# Patient Record
Sex: MALE | Race: WHITE | NOT HISPANIC OR LATINO | Employment: FULL TIME | URBAN - METROPOLITAN AREA
[De-identification: names, ages, dates, MRNs, and addresses within clinical notes are randomized per-mention and may not be internally consistent; named-entity substitution may affect disease eponyms.]

---

## 2023-04-05 DIAGNOSIS — R05.3 CHRONIC COUGH: ICD-10-CM

## 2023-04-05 DIAGNOSIS — I25.10 CORONARY ARTERY DISEASE INVOLVING NATIVE HEART, UNSPECIFIED VESSEL OR LESION TYPE, UNSPECIFIED WHETHER ANGINA PRESENT: ICD-10-CM

## 2023-04-05 DIAGNOSIS — E78.5 DYSLIPIDEMIA: ICD-10-CM

## 2023-04-05 DIAGNOSIS — I10 HYPERTENSION, UNSPECIFIED TYPE: ICD-10-CM

## 2023-04-05 DIAGNOSIS — J30.9 ALLERGIC RHINITIS, UNSPECIFIED SEASONALITY, UNSPECIFIED TRIGGER: ICD-10-CM

## 2023-04-05 PROBLEM — R91.8 LUNG NODULES: Status: ACTIVE | Noted: 2023-04-05

## 2023-04-05 PROBLEM — M70.20 OLECRANON BURSITIS: Status: ACTIVE | Noted: 2023-04-05

## 2023-04-05 PROBLEM — J44.9 COPD (CHRONIC OBSTRUCTIVE PULMONARY DISEASE) (MULTI): Status: ACTIVE | Noted: 2023-04-05

## 2023-04-05 PROBLEM — I65.29 OCCLUSION AND STENOSIS OF UNSPECIFIED CAROTID ARTERY: Status: ACTIVE | Noted: 2023-04-05

## 2023-04-05 PROBLEM — E55.9 VITAMIN D DEFICIENCY: Status: ACTIVE | Noted: 2023-04-05

## 2023-04-05 PROBLEM — G47.33 OSA (OBSTRUCTIVE SLEEP APNEA): Status: ACTIVE | Noted: 2023-04-05

## 2023-04-05 PROBLEM — N52.9 MALE ERECTILE DISORDER OF ORGANIC ORIGIN: Status: ACTIVE | Noted: 2023-04-05

## 2023-04-05 RX ORDER — MONTELUKAST SODIUM 10 MG/1
1 TABLET ORAL NIGHTLY
COMMUNITY
Start: 2013-08-14 | End: 2023-04-05 | Stop reason: SDUPTHER

## 2023-04-05 RX ORDER — LOSARTAN POTASSIUM 100 MG/1
100 TABLET ORAL DAILY
Qty: 90 TABLET | Refills: 3 | Status: SHIPPED | OUTPATIENT
Start: 2023-04-05 | End: 2024-04-23

## 2023-04-05 RX ORDER — AMLODIPINE BESYLATE 5 MG/1
1 TABLET ORAL DAILY
COMMUNITY
Start: 2018-04-30 | End: 2023-04-05 | Stop reason: SDUPTHER

## 2023-04-05 RX ORDER — MONTELUKAST SODIUM 10 MG/1
10 TABLET ORAL NIGHTLY
Qty: 90 TABLET | Refills: 3 | Status: SHIPPED | OUTPATIENT
Start: 2023-04-05 | End: 2024-06-03

## 2023-04-05 RX ORDER — AZELASTINE 1 MG/ML
2 SPRAY, METERED NASAL 2 TIMES DAILY
COMMUNITY
Start: 2017-09-19 | End: 2023-04-05 | Stop reason: SDUPTHER

## 2023-04-05 RX ORDER — AMLODIPINE BESYLATE 5 MG/1
5 TABLET ORAL DAILY
Qty: 90 TABLET | Refills: 3 | Status: SHIPPED | OUTPATIENT
Start: 2023-04-05 | End: 2024-04-29 | Stop reason: SDUPTHER

## 2023-04-05 RX ORDER — UMECLIDINIUM BROMIDE AND VILANTEROL TRIFENATATE 62.5; 25 UG/1; UG/1
1 POWDER RESPIRATORY (INHALATION) DAILY
COMMUNITY
Start: 2020-11-04

## 2023-04-05 RX ORDER — ASPIRIN 81 MG/1
1 TABLET ORAL DAILY
COMMUNITY
Start: 2020-05-01

## 2023-04-05 RX ORDER — SILDENAFIL 100 MG/1
TABLET, FILM COATED ORAL
COMMUNITY
Start: 2014-06-03 | End: 2023-09-08 | Stop reason: SDUPTHER

## 2023-04-05 RX ORDER — TERBINAFINE HYDROCHLORIDE 250 MG/1
1 TABLET ORAL DAILY
COMMUNITY
Start: 2022-07-22 | End: 2023-08-18 | Stop reason: ALTCHOICE

## 2023-04-05 RX ORDER — ATORVASTATIN CALCIUM 20 MG/1
1 TABLET, FILM COATED ORAL NIGHTLY
COMMUNITY
Start: 2020-05-04 | End: 2023-04-05 | Stop reason: SDUPTHER

## 2023-04-05 RX ORDER — ATORVASTATIN CALCIUM 20 MG/1
20 TABLET, FILM COATED ORAL NIGHTLY
Qty: 90 TABLET | Refills: 3 | Status: SHIPPED | OUTPATIENT
Start: 2023-04-05 | End: 2024-04-08

## 2023-04-05 RX ORDER — HYDROCHLOROTHIAZIDE 12.5 MG/1
12.5 TABLET ORAL DAILY
Qty: 90 TABLET | Refills: 3 | Status: SHIPPED | OUTPATIENT
Start: 2023-04-05 | End: 2024-06-03

## 2023-04-05 RX ORDER — HYDROCHLOROTHIAZIDE 12.5 MG/1
1 TABLET ORAL DAILY
COMMUNITY
Start: 2018-04-29 | End: 2023-04-05 | Stop reason: SDUPTHER

## 2023-04-05 RX ORDER — ALBUTEROL SULFATE 90 UG/1
2 AEROSOL, METERED RESPIRATORY (INHALATION) EVERY 6 HOURS PRN
COMMUNITY
Start: 2013-08-14 | End: 2023-10-12 | Stop reason: SDUPTHER

## 2023-04-05 RX ORDER — AZELASTINE 1 MG/ML
2 SPRAY, METERED NASAL 2 TIMES DAILY
Qty: 72 ML | Refills: 3 | Status: SHIPPED | OUTPATIENT
Start: 2023-04-05 | End: 2024-04-23

## 2023-08-18 ENCOUNTER — OFFICE VISIT (OUTPATIENT)
Dept: PRIMARY CARE | Facility: CLINIC | Age: 63
End: 2023-08-18
Payer: COMMERCIAL

## 2023-08-18 VITALS
OXYGEN SATURATION: 98 % | TEMPERATURE: 98.5 F | BODY MASS INDEX: 24.86 KG/M2 | HEART RATE: 61 BPM | DIASTOLIC BLOOD PRESSURE: 80 MMHG | WEIGHT: 177.6 LBS | SYSTOLIC BLOOD PRESSURE: 128 MMHG | HEIGHT: 71 IN | RESPIRATION RATE: 16 BRPM

## 2023-08-18 DIAGNOSIS — I10 PRIMARY HYPERTENSION: ICD-10-CM

## 2023-08-18 DIAGNOSIS — R91.8 LUNG NODULES: ICD-10-CM

## 2023-08-18 DIAGNOSIS — G47.31 CENTRAL SLEEP APNEA: ICD-10-CM

## 2023-08-18 DIAGNOSIS — J44.9 CHRONIC OBSTRUCTIVE PULMONARY DISEASE, UNSPECIFIED COPD TYPE (MULTI): ICD-10-CM

## 2023-08-18 DIAGNOSIS — Z12.5 SCREENING PSA (PROSTATE SPECIFIC ANTIGEN): ICD-10-CM

## 2023-08-18 DIAGNOSIS — Z00.00 WELL ADULT EXAM: Primary | ICD-10-CM

## 2023-08-18 DIAGNOSIS — I65.29 OCCLUSION AND STENOSIS OF UNSPECIFIED CAROTID ARTERY: ICD-10-CM

## 2023-08-18 PROBLEM — M25.562 LEFT KNEE PAIN: Status: ACTIVE | Noted: 2023-08-18

## 2023-08-18 PROBLEM — R94.39 ABNORMAL STRESS TEST: Status: ACTIVE | Noted: 2023-08-18

## 2023-08-18 PROBLEM — R05.3 CHRONIC COUGH: Status: RESOLVED | Noted: 2023-04-05 | Resolved: 2023-08-18

## 2023-08-18 PROBLEM — M25.562 LEFT KNEE PAIN: Status: RESOLVED | Noted: 2023-08-18 | Resolved: 2023-08-18

## 2023-08-18 PROBLEM — R07.9 CHEST PAIN: Status: RESOLVED | Noted: 2023-08-18 | Resolved: 2023-08-18

## 2023-08-18 PROBLEM — M70.20 OLECRANON BURSITIS: Status: RESOLVED | Noted: 2023-04-05 | Resolved: 2023-08-18

## 2023-08-18 PROBLEM — R07.9 CHEST PAIN: Status: ACTIVE | Noted: 2023-08-18

## 2023-08-18 LAB
ALANINE AMINOTRANSFERASE (SGPT) (U/L) IN SER/PLAS: 52 U/L (ref 10–52)
ALBUMIN (G/DL) IN SER/PLAS: 4.4 G/DL (ref 3.4–5)
ALKALINE PHOSPHATASE (U/L) IN SER/PLAS: 65 U/L (ref 33–136)
ANION GAP IN SER/PLAS: 10 MMOL/L (ref 10–20)
ASPARTATE AMINOTRANSFERASE (SGOT) (U/L) IN SER/PLAS: 45 U/L (ref 9–39)
BILIRUBIN TOTAL (MG/DL) IN SER/PLAS: 0.6 MG/DL (ref 0–1.2)
CALCIUM (MG/DL) IN SER/PLAS: 9.6 MG/DL (ref 8.6–10.6)
CARBON DIOXIDE, TOTAL (MMOL/L) IN SER/PLAS: 31 MMOL/L (ref 21–32)
CHLORIDE (MMOL/L) IN SER/PLAS: 103 MMOL/L (ref 98–107)
CHOLESTEROL (MG/DL) IN SER/PLAS: 148 MG/DL (ref 0–199)
CHOLESTEROL IN HDL (MG/DL) IN SER/PLAS: 67.2 MG/DL
CHOLESTEROL/HDL RATIO: 2.2
CREATININE (MG/DL) IN SER/PLAS: 1.22 MG/DL (ref 0.5–1.3)
ERYTHROCYTE DISTRIBUTION WIDTH (RATIO) BY AUTOMATED COUNT: 12.7 % (ref 11.5–14.5)
ERYTHROCYTE MEAN CORPUSCULAR HEMOGLOBIN CONCENTRATION (G/DL) BY AUTOMATED: 32.7 G/DL (ref 32–36)
ERYTHROCYTE MEAN CORPUSCULAR VOLUME (FL) BY AUTOMATED COUNT: 96 FL (ref 80–100)
ERYTHROCYTES (10*6/UL) IN BLOOD BY AUTOMATED COUNT: 4.27 X10E12/L (ref 4.5–5.9)
GFR MALE: 68 ML/MIN/1.73M2
GLUCOSE (MG/DL) IN SER/PLAS: 90 MG/DL (ref 74–99)
HEMATOCRIT (%) IN BLOOD BY AUTOMATED COUNT: 41 % (ref 41–52)
HEMOGLOBIN (G/DL) IN BLOOD: 13.4 G/DL (ref 13.5–17.5)
LDL: 72 MG/DL (ref 0–99)
LEUKOCYTES (10*3/UL) IN BLOOD BY AUTOMATED COUNT: 7.6 X10E9/L (ref 4.4–11.3)
NRBC (PER 100 WBCS) BY AUTOMATED COUNT: 0 /100 WBC (ref 0–0)
PLATELETS (10*3/UL) IN BLOOD AUTOMATED COUNT: 229 X10E9/L (ref 150–450)
POTASSIUM (MMOL/L) IN SER/PLAS: 4.3 MMOL/L (ref 3.5–5.3)
PROSTATE SPECIFIC AG (NG/ML) IN SER/PLAS: 0.69 NG/ML (ref 0–4)
PROTEIN TOTAL: 6.8 G/DL (ref 6.4–8.2)
SODIUM (MMOL/L) IN SER/PLAS: 140 MMOL/L (ref 136–145)
TRIGLYCERIDE (MG/DL) IN SER/PLAS: 43 MG/DL (ref 0–149)
UREA NITROGEN (MG/DL) IN SER/PLAS: 23 MG/DL (ref 6–23)
VLDL: 9 MG/DL (ref 0–40)

## 2023-08-18 PROCEDURE — 99396 PREV VISIT EST AGE 40-64: CPT | Performed by: FAMILY MEDICINE

## 2023-08-18 PROCEDURE — 3074F SYST BP LT 130 MM HG: CPT | Performed by: FAMILY MEDICINE

## 2023-08-18 PROCEDURE — 84153 ASSAY OF PSA TOTAL: CPT

## 2023-08-18 PROCEDURE — 85027 COMPLETE CBC AUTOMATED: CPT

## 2023-08-18 PROCEDURE — 80061 LIPID PANEL: CPT

## 2023-08-18 PROCEDURE — 3079F DIAST BP 80-89 MM HG: CPT | Performed by: FAMILY MEDICINE

## 2023-08-18 PROCEDURE — 80053 COMPREHEN METABOLIC PANEL: CPT

## 2023-08-18 PROCEDURE — 1036F TOBACCO NON-USER: CPT | Performed by: FAMILY MEDICINE

## 2023-08-18 RX ORDER — MELOXICAM 15 MG/1
7.5 TABLET ORAL DAILY
COMMUNITY
End: 2023-08-18 | Stop reason: ALTCHOICE

## 2023-08-18 RX ORDER — IBUPROFEN 800 MG/1
800 TABLET ORAL EVERY 6 HOURS
COMMUNITY
End: 2023-08-18 | Stop reason: ALTCHOICE

## 2023-08-18 RX ORDER — ROSUVASTATIN CALCIUM 20 MG/1
20 TABLET, COATED ORAL DAILY
COMMUNITY

## 2023-08-18 RX ORDER — FLUTICASONE PROPIONATE 50 MCG
1 SPRAY, SUSPENSION (ML) NASAL DAILY
COMMUNITY
Start: 2020-10-06

## 2023-08-18 RX ORDER — FLUTICASONE PROPIONATE 50 UG/1
1 POWDER, METERED RESPIRATORY (INHALATION)
COMMUNITY
End: 2023-08-18 | Stop reason: ALTCHOICE

## 2023-08-18 RX ORDER — FLUTICASONE PROPIONATE AND SALMETEROL 250; 50 UG/1; UG/1
1 POWDER RESPIRATORY (INHALATION)
COMMUNITY
End: 2023-08-18 | Stop reason: ALTCHOICE

## 2023-08-18 RX ORDER — CLOPIDOGREL BISULFATE 75 MG/1
75 TABLET ORAL DAILY
COMMUNITY

## 2023-08-18 ASSESSMENT — ENCOUNTER SYMPTOMS
BACK PAIN: 0
WEAKNESS: 1
ABDOMINAL PAIN: 0
BLOOD IN STOOL: 0
HEADACHES: 0
EYE REDNESS: 0
SORE THROAT: 0
CHILLS: 0
RHINORRHEA: 0
BRUISES/BLEEDS EASILY: 0
LOSS OF SENSATION IN FEET: 0
HALLUCINATIONS: 0
EYE PAIN: 0
FATIGUE: 0
ADENOPATHY: 0
DYSURIA: 0
WOUND: 0
COUGH: 0
NECK STIFFNESS: 0
OCCASIONAL FEELINGS OF UNSTEADINESS: 0
HEMATURIA: 0
SHORTNESS OF BREATH: 0
FEVER: 0
PALPITATIONS: 0
POLYDIPSIA: 0
DEPRESSION: 0

## 2023-08-18 ASSESSMENT — PATIENT HEALTH QUESTIONNAIRE - PHQ9
SUM OF ALL RESPONSES TO PHQ9 QUESTIONS 1 AND 2: 0
2. FEELING DOWN, DEPRESSED OR HOPELESS: NOT AT ALL
1. LITTLE INTEREST OR PLEASURE IN DOING THINGS: NOT AT ALL

## 2023-08-18 NOTE — PROGRESS NOTES
Sebastián Cronin is a 62 y.o. male with Chief Complaint of Annual Exam (CPE).    Runs for exercise daily.   Down 15# [id number] -- due to extensive dental work. Invisilign.    Past Surgical History:   Procedure Laterality Date    COLONOSCOPY  02/14/2017    Complete Colonoscopy    TONSILLECTOMY  08/14/2013    Tonsillectomy      Social History     Socioeconomic History    Marital status:      Spouse name: Not on file    Number of children: Not on file    Years of education: Not on file    Highest education level: Not on file   Occupational History    Not on file   Tobacco Use    Smoking status: Former     Types: Cigarettes     Passive exposure: Past    Smokeless tobacco: Never   Vaping Use    Vaping Use: Never used   Substance and Sexual Activity    Alcohol use: Not Currently    Drug use: Never    Sexual activity: Yes     Partners: Female   Other Topics Concern    Not on file   Social History Narrative    Not on file     Social Determinants of Health     Financial Resource Strain: Not on file   Food Insecurity: Not on file   Transportation Needs: Not on file   Physical Activity: Not on file   Stress: Not on file   Social Connections: Not on file   Intimate Partner Violence: Not on file   Housing Stability: Not on file     Past Medical History:   Diagnosis Date    Acute upper respiratory infection, unspecified 08/24/2015    URI, acute    Lateral epicondylitis, unspecified elbow 04/01/2015    Lateral epicondylitis    Olecranon bursitis 04/05/2023    Other specified disorders of Eustachian tube, unspecified ear 02/08/2016    Eustachian tube dysfunction    Pain in unspecified shoulder 07/09/2014    Shoulder pain    Personal history of other diseases of the respiratory system 03/05/2020    History of pleurisy    Personal history of other diseases of the respiratory system 12/26/2014    History of acute sinusitis    Personal history of other diseases of the respiratory system 06/15/2016    History of allergic rhinitis  "     No family history on file.     Review of Systems   Constitutional:  Negative for chills, fatigue and fever.   HENT:  Negative for rhinorrhea and sore throat.    Eyes:  Negative for pain and redness.   Respiratory:  Negative for cough and shortness of breath.    Cardiovascular:  Negative for chest pain and palpitations.   Gastrointestinal:  Negative for abdominal pain and blood in stool.   Endocrine: Negative for polydipsia and polyuria.   Genitourinary:  Negative for dysuria and hematuria.   Musculoskeletal:  Negative for back pain and neck stiffness.   Skin:  Negative for rash and wound.   Allergic/Immunologic: Negative for environmental allergies and food allergies.   Neurological:  Positive for weakness. Negative for headaches.   Hematological:  Negative for adenopathy. Does not bruise/bleed easily.   Psychiatric/Behavioral:  Negative for hallucinations and suicidal ideas.       /80 (BP Location: Left arm, Patient Position: Sitting, BP Cuff Size: Adult)   Pulse 61   Temp 36.9 °C (98.5 °F) (Temporal)   Resp 16   Ht 1.803 m (5' 11\")   Wt 80.6 kg (177 lb 9.6 oz)   SpO2 98%   BMI 24.77 kg/m²   Physical Exam  Vitals reviewed.   Constitutional:       General: He is not in acute distress.     Appearance: He is not ill-appearing.   HENT:      Head: Normocephalic and atraumatic.      Right Ear: Tympanic membrane normal.      Left Ear: Tympanic membrane normal.      Nose: No congestion or rhinorrhea.      Mouth/Throat:      Pharynx: No oropharyngeal exudate or posterior oropharyngeal erythema.   Eyes:      Extraocular Movements: Extraocular movements intact.      Conjunctiva/sclera: Conjunctivae normal.      Pupils: Pupils are equal, round, and reactive to light.   Cardiovascular:      Rate and Rhythm: Normal rate and regular rhythm.      Heart sounds: No murmur heard.     No friction rub. No gallop.   Pulmonary:      Effort: Pulmonary effort is normal.      Breath sounds: Normal breath sounds. No " "wheezing, rhonchi or rales.   Abdominal:      General: There is no distension.      Palpations: Abdomen is soft.      Tenderness: There is no abdominal tenderness. There is no guarding or rebound.   Musculoskeletal:         General: No swelling or deformity.      Cervical back: Normal range of motion and neck supple.      Right lower leg: No edema.      Left lower leg: No edema.   Skin:     Capillary Refill: Capillary refill takes less than 2 seconds.      Coloration: Skin is not jaundiced.      Findings: No rash.   Neurological:      General: No focal deficit present.      Mental Status: He is alert.      Motor: No weakness.   Psychiatric:         Mood and Affect: Mood normal.         Behavior: Behavior normal.       Lab Results   Component Value Date    WBC 8.0 07/22/2022    HGB 13.8 07/22/2022    HCT 41.0 07/22/2022    MCV 93 07/22/2022     07/22/2022     Lab Results   Component Value Date    CHOL 138 07/22/2022    CHOL 139 05/07/2021    CHOL 187 07/13/2019     Lab Results   Component Value Date    HDL 55.2 07/22/2022    HDL 61.0 05/07/2021    HDL 52.1 07/13/2019     No results found for: \"LDLCALC\"  Lab Results   Component Value Date    TRIG 59 07/22/2022    TRIG 48 05/07/2021    TRIG 69 07/13/2019     No components found for: \"CHOLHDL\"  No results found for: \"HGBA1C\"    Assessment/Plan   Problem List Items Addressed This Visit       COPD (chronic obstructive pulmonary disease) (CMS/[facility])    Overview     Stable on Anoro, albuterol. follow up Dr. Hogue. Quit smoking 1997.         Hypertension    Current Assessment & Plan     stable on amlodipine 5, losartan 100 and hctz 12.5.          Lung nodules    Overview     2019 CT: 2 stable lung nodules.  No further monitoring necessary.         Occlusion and stenosis of unspecified carotid artery    Overview     Left carotid stenosis 100% s/p dissection -- with some recanalization.  Sees vascular surgeon Dr Giang at [facility] for annual US.         Central sleep apnea    " Overview      BiPap per NANCY (Mykel)   No falling asleep at wheel.             Well adult exam - Primary    Overview     8/18/23 PT PREVENTATIVE EXAM:  BMI in normal range after 15# wt loss during year long dental work. Last colonoscopy 9/2022 next due 2027. Check labs.     Discussed taking some breakfast before exercise to prevent low sugars which might be causing leg weakness mid -run.

## 2023-08-18 NOTE — PATIENT INSTRUCTIONS
Discussed taking some breakfast before exercise to prevent low sugars which might be causing leg weakness mid-run.

## 2023-09-08 DIAGNOSIS — N52.9 MALE ERECTILE DISORDER OF ORGANIC ORIGIN: ICD-10-CM

## 2023-09-08 RX ORDER — SILDENAFIL 100 MG/1
100 TABLET, FILM COATED ORAL AS NEEDED
Qty: 10 TABLET | Refills: 6 | Status: SHIPPED | OUTPATIENT
Start: 2023-09-08 | End: 2024-02-21

## 2023-10-12 DIAGNOSIS — J44.1 CHRONIC OBSTRUCTIVE PULMONARY DISEASE WITH ACUTE EXACERBATION (MULTI): ICD-10-CM

## 2023-10-12 RX ORDER — ALBUTEROL SULFATE 90 UG/1
2 AEROSOL, METERED RESPIRATORY (INHALATION) EVERY 6 HOURS PRN
Qty: 3 G | Refills: 3 | Status: SHIPPED | OUTPATIENT
Start: 2023-10-12 | End: 2024-01-10

## 2023-11-21 ENCOUNTER — TELEPHONE (OUTPATIENT)
Dept: PRIMARY CARE | Facility: CLINIC | Age: 63
End: 2023-11-21
Payer: COMMERCIAL

## 2023-11-21 NOTE — TELEPHONE ENCOUNTER
Pt wife Carla olivera stated that the pt is having horrible leg crampsespecially at night wantsd to know if he should have bld work or office visit ?

## 2023-11-22 DIAGNOSIS — R25.2 LEG CRAMPING: ICD-10-CM

## 2023-11-24 ENCOUNTER — LAB (OUTPATIENT)
Dept: LAB | Facility: LAB | Age: 63
End: 2023-11-24
Payer: COMMERCIAL

## 2023-11-24 DIAGNOSIS — R25.2 LEG CRAMPING: ICD-10-CM

## 2023-11-24 LAB
ALBUMIN SERPL BCP-MCNC: 4.1 G/DL (ref 3.4–5)
ALP SERPL-CCNC: 64 U/L (ref 33–136)
ALT SERPL W P-5'-P-CCNC: 42 U/L (ref 10–52)
ANION GAP SERPL CALC-SCNC: 14 MMOL/L (ref 10–20)
AST SERPL W P-5'-P-CCNC: 33 U/L (ref 9–39)
BILIRUB SERPL-MCNC: 0.6 MG/DL (ref 0–1.2)
BUN SERPL-MCNC: 21 MG/DL (ref 6–23)
CALCIUM SERPL-MCNC: 9.3 MG/DL (ref 8.6–10.3)
CHLORIDE SERPL-SCNC: 102 MMOL/L (ref 98–107)
CO2 SERPL-SCNC: 31 MMOL/L (ref 21–32)
CREAT SERPL-MCNC: 1.27 MG/DL (ref 0.5–1.3)
ERYTHROCYTE [DISTWIDTH] IN BLOOD BY AUTOMATED COUNT: 13.1 % (ref 11.5–14.5)
GFR SERPL CREATININE-BSD FRML MDRD: 64 ML/MIN/1.73M*2
GLUCOSE SERPL-MCNC: 77 MG/DL (ref 74–99)
HCT VFR BLD AUTO: 42.3 % (ref 41–52)
HGB BLD-MCNC: 13.8 G/DL (ref 13.5–17.5)
MCH RBC QN AUTO: 31 PG (ref 26–34)
MCHC RBC AUTO-ENTMCNC: 32.6 G/DL (ref 32–36)
MCV RBC AUTO: 95 FL (ref 80–100)
NRBC BLD-RTO: 0 /100 WBCS (ref 0–0)
PLATELET # BLD AUTO: 261 X10*3/UL (ref 150–450)
POTASSIUM SERPL-SCNC: 4.3 MMOL/L (ref 3.5–5.3)
PROT SERPL-MCNC: 6.4 G/DL (ref 6.4–8.2)
RBC # BLD AUTO: 4.45 X10*6/UL (ref 4.5–5.9)
SODIUM SERPL-SCNC: 143 MMOL/L (ref 136–145)
WBC # BLD AUTO: 8.7 X10*3/UL (ref 4.4–11.3)

## 2023-11-24 PROCEDURE — 36415 COLL VENOUS BLD VENIPUNCTURE: CPT

## 2023-11-24 PROCEDURE — 80053 COMPREHEN METABOLIC PANEL: CPT

## 2023-11-24 PROCEDURE — 85027 COMPLETE CBC AUTOMATED: CPT

## 2023-12-01 DIAGNOSIS — R63.4 ABNORMAL WEIGHT LOSS: Primary | ICD-10-CM

## 2023-12-02 ENCOUNTER — LAB (OUTPATIENT)
Dept: LAB | Facility: LAB | Age: 63
End: 2023-12-02
Payer: COMMERCIAL

## 2023-12-02 DIAGNOSIS — R63.4 ABNORMAL WEIGHT LOSS: ICD-10-CM

## 2023-12-02 LAB
ALBUMIN SERPL BCP-MCNC: 4.4 G/DL (ref 3.4–5)
ALP SERPL-CCNC: 68 U/L (ref 33–136)
ALT SERPL W P-5'-P-CCNC: 49 U/L (ref 10–52)
ANION GAP SERPL CALC-SCNC: 15 MMOL/L (ref 10–20)
AST SERPL W P-5'-P-CCNC: 26 U/L (ref 9–39)
BILIRUB SERPL-MCNC: 0.7 MG/DL (ref 0–1.2)
BUN SERPL-MCNC: 18 MG/DL (ref 6–23)
CALCIUM SERPL-MCNC: 9.4 MG/DL (ref 8.6–10.3)
CHLORIDE SERPL-SCNC: 103 MMOL/L (ref 98–107)
CO2 SERPL-SCNC: 28 MMOL/L (ref 21–32)
CREAT SERPL-MCNC: 1.13 MG/DL (ref 0.5–1.3)
ERYTHROCYTE [DISTWIDTH] IN BLOOD BY AUTOMATED COUNT: 13.3 % (ref 11.5–14.5)
GFR SERPL CREATININE-BSD FRML MDRD: 74 ML/MIN/1.73M*2
GLUCOSE SERPL-MCNC: 84 MG/DL (ref 74–99)
HCT VFR BLD AUTO: 43 % (ref 41–52)
HGB BLD-MCNC: 13.9 G/DL (ref 13.5–17.5)
MCH RBC QN AUTO: 31.3 PG (ref 26–34)
MCHC RBC AUTO-ENTMCNC: 32.3 G/DL (ref 32–36)
MCV RBC AUTO: 97 FL (ref 80–100)
NRBC BLD-RTO: 0 /100 WBCS (ref 0–0)
PLATELET # BLD AUTO: 228 X10*3/UL (ref 150–450)
POTASSIUM SERPL-SCNC: 4.6 MMOL/L (ref 3.5–5.3)
PREALB SERPL-MCNC: 25.1 MG/DL (ref 18–40)
PROT SERPL-MCNC: 6.4 G/DL (ref 6.4–8.2)
RBC # BLD AUTO: 4.44 X10*6/UL (ref 4.5–5.9)
SODIUM SERPL-SCNC: 141 MMOL/L (ref 136–145)
T4 FREE SERPL-MCNC: 0.88 NG/DL (ref 0.61–1.12)
TSH SERPL-ACNC: 4.17 MIU/L (ref 0.44–3.98)
WBC # BLD AUTO: 7.3 X10*3/UL (ref 4.4–11.3)

## 2023-12-02 PROCEDURE — 84134 ASSAY OF PREALBUMIN: CPT

## 2023-12-02 PROCEDURE — 36415 COLL VENOUS BLD VENIPUNCTURE: CPT

## 2023-12-02 PROCEDURE — 85027 COMPLETE CBC AUTOMATED: CPT

## 2023-12-02 PROCEDURE — 84443 ASSAY THYROID STIM HORMONE: CPT

## 2023-12-02 PROCEDURE — 80053 COMPREHEN METABOLIC PANEL: CPT

## 2023-12-02 PROCEDURE — 84439 ASSAY OF FREE THYROXINE: CPT

## 2024-02-21 DIAGNOSIS — N52.9 MALE ERECTILE DISORDER OF ORGANIC ORIGIN: ICD-10-CM

## 2024-02-21 RX ORDER — SILDENAFIL 100 MG/1
100 TABLET, FILM COATED ORAL AS NEEDED
Qty: 30 TABLET | Refills: 3 | Status: SHIPPED | OUTPATIENT
Start: 2024-02-21

## 2024-04-08 DIAGNOSIS — I25.10 CORONARY ARTERY DISEASE INVOLVING NATIVE HEART, UNSPECIFIED VESSEL OR LESION TYPE, UNSPECIFIED WHETHER ANGINA PRESENT: ICD-10-CM

## 2024-04-08 DIAGNOSIS — E78.5 DYSLIPIDEMIA: ICD-10-CM

## 2024-04-08 RX ORDER — ATORVASTATIN CALCIUM 20 MG/1
20 TABLET, FILM COATED ORAL NIGHTLY
Qty: 90 TABLET | Refills: 3 | Status: SHIPPED | OUTPATIENT
Start: 2024-04-08

## 2024-04-22 DIAGNOSIS — I10 HYPERTENSION, UNSPECIFIED TYPE: ICD-10-CM

## 2024-04-23 DIAGNOSIS — R05.3 CHRONIC COUGH: ICD-10-CM

## 2024-04-23 DIAGNOSIS — J30.9 ALLERGIC RHINITIS, UNSPECIFIED SEASONALITY, UNSPECIFIED TRIGGER: ICD-10-CM

## 2024-04-23 RX ORDER — LOSARTAN POTASSIUM 100 MG/1
100 TABLET ORAL DAILY
Qty: 90 TABLET | Refills: 3 | Status: SHIPPED | OUTPATIENT
Start: 2024-04-23

## 2024-04-23 RX ORDER — AZELASTINE 1 MG/ML
2 SPRAY, METERED NASAL 2 TIMES DAILY
Qty: 90 ML | Refills: 3 | Status: SHIPPED | OUTPATIENT
Start: 2024-04-23

## 2024-04-29 DIAGNOSIS — I10 HYPERTENSION, UNSPECIFIED TYPE: ICD-10-CM

## 2024-04-29 RX ORDER — AMLODIPINE BESYLATE 5 MG/1
5 TABLET ORAL DAILY
Qty: 90 TABLET | Refills: 3 | Status: SHIPPED | OUTPATIENT
Start: 2024-04-29

## 2024-05-09 ENCOUNTER — OFFICE VISIT (OUTPATIENT)
Dept: CARDIOLOGY | Facility: HOSPITAL | Age: 64
End: 2024-05-09
Payer: COMMERCIAL

## 2024-05-09 VITALS
BODY MASS INDEX: 24.48 KG/M2 | HEART RATE: 59 BPM | SYSTOLIC BLOOD PRESSURE: 129 MMHG | DIASTOLIC BLOOD PRESSURE: 78 MMHG | WEIGHT: 175.49 LBS | OXYGEN SATURATION: 96 %

## 2024-05-09 DIAGNOSIS — I10 HYPERTENSION, UNSPECIFIED TYPE: ICD-10-CM

## 2024-05-09 DIAGNOSIS — I25.10 CORONARY ARTERY DISEASE INVOLVING NATIVE CORONARY ARTERY OF NATIVE HEART WITHOUT ANGINA PECTORIS: Primary | ICD-10-CM

## 2024-05-09 PROCEDURE — 3078F DIAST BP <80 MM HG: CPT | Performed by: INTERNAL MEDICINE

## 2024-05-09 PROCEDURE — 99213 OFFICE O/P EST LOW 20 MIN: CPT | Performed by: INTERNAL MEDICINE

## 2024-05-09 PROCEDURE — 1036F TOBACCO NON-USER: CPT | Performed by: INTERNAL MEDICINE

## 2024-05-09 PROCEDURE — 93005 ELECTROCARDIOGRAM TRACING: CPT | Performed by: INTERNAL MEDICINE

## 2024-05-09 PROCEDURE — 3074F SYST BP LT 130 MM HG: CPT | Performed by: INTERNAL MEDICINE

## 2024-05-09 PROCEDURE — 93010 ELECTROCARDIOGRAM REPORT: CPT | Performed by: INTERNAL MEDICINE

## 2024-05-09 NOTE — PROGRESS NOTES
Chief Complaint:   No chief complaint on file.     History Of Present Illness:    Sebastián Cronin is a 63 y.o. male presenting for follow-up.  Doing well from a cardiac standpoint.  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  Denies any chest pain, shortness of breath, dizziness, palpitations.  Compliant with medications.     Last Recorded Vitals:  Vitals:    05/09/24 0801   BP: 129/78   Pulse: 59   SpO2: 96%   Weight: 79.6 kg (175 lb 7.8 oz)       Past Medical History:  He has a past medical history of Acute upper respiratory infection, unspecified (08/24/2015), Lateral epicondylitis, unspecified elbow (04/01/2015), Olecranon bursitis (04/05/2023), Other specified disorders of Eustachian tube, unspecified ear (02/08/2016), Pain in unspecified shoulder (07/09/2014), Personal history of other diseases of the respiratory system (03/05/2020), Personal history of other diseases of the respiratory system (12/26/2014), and Personal history of other diseases of the respiratory system (06/15/2016).    Past Surgical History:  He has a past surgical history that includes Tonsillectomy (08/14/2013) and Colonoscopy (02/14/2017).      Social History:  He reports that he has quit smoking. His smoking use included cigarettes. He has been exposed to tobacco smoke. He has never used smokeless tobacco. He reports that he does not currently use alcohol. He reports that he does not use drugs.    Family History:  No family history on file.     Allergies:  Diphenhydramine and Antihistamine-1    Outpatient Medications:  Current Outpatient Medications   Medication Instructions    albuterol 90 mcg/actuation inhaler 2 puffs, inhalation, Every 6 hours PRN    amLODIPine (NORVASC) 5 mg, oral, Daily    Anoro Ellipta 62.5-25 mcg/actuation blister with device 1 puff, inhalation, Daily    aspirin 81 mg EC tablet 1 tablet, oral, Daily    atorvastatin (LIPITOR) 20 mg, oral, Nightly    azelastine  (Astelin) 137 mcg (0.1 %) nasal spray 2 sprays, Each Nostril, 2 times daily    clopidogrel (PLAVIX) 75 mg, oral, Daily    fluticasone (Flonase) 50 mcg/actuation nasal spray 1 spray, Each Nostril, Daily    hydroCHLOROthiazide (MICROZIDE) 12.5 mg, oral, Daily    losartan (COZAAR) 100 mg, oral, Daily    montelukast (SINGULAIR) 10 mg, oral, Nightly    rosuvastatin (CRESTOR) 20 mg, oral, Daily    sildenafil (VIAGRA) 100 mg, oral, As needed       Physical Exam:  Constitutional:       Appearance: Healthy appearance. Not in distress.   Neck:      Vascular: No JVR. JVD normal.   Pulmonary:      Effort: Pulmonary effort is normal.      Breath sounds: Normal breath sounds. No wheezing. No rhonchi. No rales.   Chest:      Chest wall: Not tender to palpatation.   Cardiovascular:      Normal rate. Regular rhythm.      Murmurs: There is no murmur.   Edema:     Peripheral edema absent.   Abdominal:      General: Bowel sounds are normal.      Palpations: Abdomen is soft.      Tenderness: There is no abdominal tenderness.   Musculoskeletal: Normal range of motion. Skin:     General: Skin is warm and dry.   Neurological:      General: No focal deficit present.      Mental Status: Alert and oriented to person, place and time.           Last Labs:  CBC -  Lab Results   Component Value Date    WBC 7.3 12/02/2023    HGB 13.9 12/02/2023    HCT 43.0 12/02/2023    MCV 97 12/02/2023     12/02/2023       CMP -  Lab Results   Component Value Date    CALCIUM 9.4 12/02/2023    PROT 6.4 12/02/2023    ALBUMIN 4.4 12/02/2023    AST 26 12/02/2023    ALT 49 12/02/2023    ALKPHOS 68 12/02/2023    BILITOT 0.7 12/02/2023       LIPID PANEL -   Lab Results   Component Value Date    CHOL 148 08/18/2023    TRIG 43 08/18/2023    HDL 67.2 08/18/2023    CHHDL 2.2 08/18/2023    LDLF 72 08/18/2023    VLDL 9 08/18/2023       RENAL FUNCTION PANEL -   Lab Results   Component Value Date    GLUCOSE 84 12/02/2023     12/02/2023    K 4.6 12/02/2023    CL  "103 12/02/2023    CO2 28 12/02/2023    ANIONGAP 15 12/02/2023    BUN 18 12/02/2023    CREATININE 1.13 12/02/2023    GFRMALE 68 08/18/2023    CALCIUM 9.4 12/02/2023    ALBUMIN 4.4 12/02/2023        No results found for: \"BNP\", \"HGBA1C\"    Last Cardiology Tests:  ECG independently reviewed from 5/4/2023: sinus rhythm with occasional PVC's, rate 59, no ST.T wave abnormality.     Cath 5/1/2020:  1. Left main: mild irregularities.  2. LAD: 30-40% mid-vessel disease.  3. LCx: mild irregularities.  4. RCA: 20-30% proximal angulated stenosis, 95% mid-vessel stenosis.  5. LVEDP 14mmHg, no aortic stenosis on LV-Ao gradient.  6. Successful PCI to severe mid-RCA stenosis with a 3.5 x 26mm Resolute ELI  post-dilated with a 3.5mm NC balloon at 22atm.     Stress echo 4/21/2020:  1. Indeterminate Stress Test: ECG portion normal, however noted to have chest  pain with peak exercise and subtle hypokinesia in the inferobasal portion)  2. The resting ejection fraction was estimated at 60% with a peak exercise  ejection fraction estimated at 60 to 65%.  3. At peak, there are stress-induced regional wall motion abnormalities (subtle  inferobasal HK with stress).  4. The adequate level of stress was achieved.    Assessment/Plan   Very pleasant 63 year-old gentleman with COPD/asthma, hypertension, asymptomatic spontaneous dissection of left carotid, CAD s/p PCI to RCA in May 2020. Doing very well from CV standpoint. BP and lipids well controlled.      Plan:  Continue current ASA, amlodipine, atorvastatin, losartan, HCTZ.    Follow up in cardiology clinic in one year, sooner if needed.      Rashad Brandt MD  "

## 2024-06-03 DIAGNOSIS — J30.9 ALLERGIC RHINITIS, UNSPECIFIED SEASONALITY, UNSPECIFIED TRIGGER: ICD-10-CM

## 2024-06-03 DIAGNOSIS — I10 HYPERTENSION, UNSPECIFIED TYPE: ICD-10-CM

## 2024-06-03 RX ORDER — HYDROCHLOROTHIAZIDE 12.5 MG/1
12.5 TABLET ORAL DAILY
Qty: 90 TABLET | Refills: 3 | Status: SHIPPED | OUTPATIENT
Start: 2024-06-03

## 2024-06-03 RX ORDER — MONTELUKAST SODIUM 10 MG/1
10 TABLET ORAL NIGHTLY
Qty: 90 TABLET | Refills: 3 | Status: SHIPPED | OUTPATIENT
Start: 2024-06-03

## 2024-06-10 LAB
ATRIAL RATE: 54 BPM
P AXIS: 60 DEGREES
P OFFSET: 196 MS
P ONSET: 139 MS
PR INTERVAL: 164 MS
Q ONSET: 221 MS
QRS COUNT: 8 BEATS
QRS DURATION: 88 MS
QT INTERVAL: 446 MS
QTC CALCULATION(BAZETT): 422 MS
QTC FREDERICIA: 430 MS
R AXIS: 80 DEGREES
T AXIS: 67 DEGREES
T OFFSET: 444 MS
VENTRICULAR RATE: 54 BPM

## 2024-06-13 ENCOUNTER — TELEPHONE (OUTPATIENT)
Dept: CARDIOLOGY | Facility: HOSPITAL | Age: 64
End: 2024-06-13
Payer: COMMERCIAL

## 2024-06-13 ENCOUNTER — TELEPHONE (OUTPATIENT)
Dept: PRIMARY CARE | Facility: CLINIC | Age: 64
End: 2024-06-13

## 2024-06-13 ENCOUNTER — HOSPITAL ENCOUNTER (EMERGENCY)
Facility: HOSPITAL | Age: 64
Discharge: LEFT BEFORE DISCHARGE | End: 2024-06-13
Payer: COMMERCIAL

## 2024-06-13 VITALS
HEART RATE: 58 BPM | BODY MASS INDEX: 23.52 KG/M2 | DIASTOLIC BLOOD PRESSURE: 75 MMHG | RESPIRATION RATE: 18 BRPM | SYSTOLIC BLOOD PRESSURE: 126 MMHG | HEIGHT: 71 IN | WEIGHT: 168 LBS | OXYGEN SATURATION: 96 % | TEMPERATURE: 98.2 F

## 2024-06-13 PROCEDURE — 4500999001 HC ED NO CHARGE

## 2024-06-13 ASSESSMENT — PAIN DESCRIPTION - LOCATION: LOCATION: ARM

## 2024-06-13 ASSESSMENT — LIFESTYLE VARIABLES
HAVE YOU EVER FELT YOU SHOULD CUT DOWN ON YOUR DRINKING: NO
HAVE PEOPLE ANNOYED YOU BY CRITICIZING YOUR DRINKING: NO
EVER FELT BAD OR GUILTY ABOUT YOUR DRINKING: NO
EVER HAD A DRINK FIRST THING IN THE MORNING TO STEADY YOUR NERVES TO GET RID OF A HANGOVER: NO
TOTAL SCORE: 0

## 2024-06-13 ASSESSMENT — PAIN DESCRIPTION - ORIENTATION: ORIENTATION: LEFT

## 2024-06-13 ASSESSMENT — PAIN DESCRIPTION - PAIN TYPE: TYPE: ACUTE PAIN

## 2024-06-13 ASSESSMENT — COLUMBIA-SUICIDE SEVERITY RATING SCALE - C-SSRS
2. HAVE YOU ACTUALLY HAD ANY THOUGHTS OF KILLING YOURSELF?: NO
1. IN THE PAST MONTH, HAVE YOU WISHED YOU WERE DEAD OR WISHED YOU COULD GO TO SLEEP AND NOT WAKE UP?: NO
6. HAVE YOU EVER DONE ANYTHING, STARTED TO DO ANYTHING, OR PREPARED TO DO ANYTHING TO END YOUR LIFE?: NO

## 2024-06-13 ASSESSMENT — PAIN - FUNCTIONAL ASSESSMENT: PAIN_FUNCTIONAL_ASSESSMENT: 0-10

## 2024-06-13 ASSESSMENT — PAIN SCALES - GENERAL: PAINLEVEL_OUTOF10: 1

## 2024-06-13 NOTE — TELEPHONE ENCOUNTER
Pt called about 35 mins ago stating he has been extremely tired and can't seem to keep his eyes open and stated he was having some aching under his left armpit. I talked the symptoms over with Dale and she recommended that he goes tot he ER to be evaluated. I informed pt and he stated ok. Pt's spouse just called  stating he's not going to the ER he needs to be seen in the office. She believes his symptoms are related to medication. I informed her I could not schedule the appt due to the symptoms and would sent a message stating that they are declining to go to the ER and also informed her I don't have Lonnie in or any soon appts with him.

## 2024-06-18 ENCOUNTER — OFFICE VISIT (OUTPATIENT)
Dept: CARDIOLOGY | Facility: CLINIC | Age: 64
End: 2024-06-18
Payer: COMMERCIAL

## 2024-06-18 VITALS
HEART RATE: 60 BPM | WEIGHT: 170.2 LBS | DIASTOLIC BLOOD PRESSURE: 69 MMHG | BODY MASS INDEX: 23.74 KG/M2 | SYSTOLIC BLOOD PRESSURE: 127 MMHG | OXYGEN SATURATION: 97 %

## 2024-06-18 DIAGNOSIS — R07.9 CHEST PAIN, UNSPECIFIED TYPE: Primary | ICD-10-CM

## 2024-06-18 DIAGNOSIS — E78.5 HYPERLIPIDEMIA, UNSPECIFIED HYPERLIPIDEMIA TYPE: ICD-10-CM

## 2024-06-18 DIAGNOSIS — I10 HYPERTENSION, UNSPECIFIED TYPE: ICD-10-CM

## 2024-06-18 PROCEDURE — 99213 OFFICE O/P EST LOW 20 MIN: CPT | Performed by: NURSE PRACTITIONER

## 2024-06-18 PROCEDURE — 3078F DIAST BP <80 MM HG: CPT | Performed by: NURSE PRACTITIONER

## 2024-06-18 PROCEDURE — 1036F TOBACCO NON-USER: CPT | Performed by: NURSE PRACTITIONER

## 2024-06-18 PROCEDURE — 3074F SYST BP LT 130 MM HG: CPT | Performed by: NURSE PRACTITIONER

## 2024-06-18 NOTE — PROGRESS NOTES
Chief Complaint:   No chief complaint on file.     History Of Present Illness:    Sebastián Cronin is a very pleasant 63 year old gentleman with a history of  CAD and HLD, he is here for a follow up visit. The patient is seen in collaboration with Dr. Brandt. Mr. Cronin complains of left arm pain and general fatigue. Fatigue is progressively worse. Continues to stay active exercising on a regular basis. Pain in his left arm had increased and was in the ER last week.     Last Recorded Vitals:  There were no vitals filed for this visit.      Past Medical History:  He has a past medical history of Acute upper respiratory infection, unspecified (08/24/2015), Lateral epicondylitis, unspecified elbow (04/01/2015), Olecranon bursitis (04/05/2023), Other specified disorders of Eustachian tube, unspecified ear (02/08/2016), Pain in unspecified shoulder (07/09/2014), Personal history of other diseases of the respiratory system (03/05/2020), Personal history of other diseases of the respiratory system (12/26/2014), and Personal history of other diseases of the respiratory system (06/15/2016).    Past Surgical History:  He has a past surgical history that includes Tonsillectomy (08/14/2013) and Colonoscopy (02/14/2017).      Social History:  He reports that he has quit smoking. His smoking use included cigarettes. He has been exposed to tobacco smoke. He has never used smokeless tobacco. He reports that he does not currently use alcohol. He reports that he does not use drugs.    Family History:  No family history on file.     Allergies:  Diphenhydramine and Antihistamine-1    Outpatient Medications:  Current Outpatient Medications   Medication Instructions    albuterol 90 mcg/actuation inhaler 2 puffs, inhalation, Every 6 hours PRN    amLODIPine (NORVASC) 5 mg, oral, Daily    Anoro Ellipta 62.5-25 mcg/actuation blister with device 1 puff, inhalation, Daily    aspirin 81 mg EC tablet 1 tablet, oral, Daily    atorvastatin (LIPITOR)  20 mg, oral, Nightly    azelastine (Astelin) 137 mcg (0.1 %) nasal spray 2 sprays, Each Nostril, 2 times daily    clopidogrel (PLAVIX) 75 mg, oral, Daily    fluticasone (Flonase) 50 mcg/actuation nasal spray 1 spray, Each Nostril, Daily    hydroCHLOROthiazide (MICROZIDE) 12.5 mg, oral, Daily    losartan (COZAAR) 100 mg, oral, Daily    montelukast (SINGULAIR) 10 mg, oral, Nightly    rosuvastatin (CRESTOR) 20 mg, oral, Daily    sildenafil (VIAGRA) 100 mg, oral, As needed       Physical Exam:  Constitutional:       Appearance: Healthy appearance. Not in distress.   Neck:      Vascular: No JVR. JVD normal.   Pulmonary:      Effort: Pulmonary effort is normal.      Breath sounds: Normal breath sounds. No wheezing. No rhonchi. No rales.   Chest:      Chest wall: Not tender to palpatation.   Cardiovascular:      Normal rate. Regular rhythm.      Murmurs: There is no murmur.   Edema:     Peripheral edema absent.   Abdominal:      General: Bowel sounds are normal.      Palpations: Abdomen is soft.      Tenderness: There is no abdominal tenderness.   Musculoskeletal: Normal range of motion. Skin:     General: Skin is warm and dry.   Neurological:      General: No focal deficit present.      Mental Status: Alert and oriented to person, place and time.           Last Labs:  CBC -  Lab Results   Component Value Date    WBC 7.3 12/02/2023    HGB 13.9 12/02/2023    HCT 43.0 12/02/2023    MCV 97 12/02/2023     12/02/2023       CMP -  Lab Results   Component Value Date    CALCIUM 9.4 12/02/2023    PROT 6.4 12/02/2023    ALBUMIN 4.4 12/02/2023    AST 26 12/02/2023    ALT 49 12/02/2023    ALKPHOS 68 12/02/2023    BILITOT 0.7 12/02/2023       LIPID PANEL -   Lab Results   Component Value Date    CHOL 148 08/18/2023    TRIG 43 08/18/2023    HDL 67.2 08/18/2023    CHHDL 2.2 08/18/2023    LDLF 72 08/18/2023    VLDL 9 08/18/2023       RENAL FUNCTION PANEL -   Lab Results   Component Value Date    GLUCOSE 84 12/02/2023      "12/02/2023    K 4.6 12/02/2023     12/02/2023    CO2 28 12/02/2023    ANIONGAP 15 12/02/2023    BUN 18 12/02/2023    CREATININE 1.13 12/02/2023    GFRMALE 68 08/18/2023    CALCIUM 9.4 12/02/2023    ALBUMIN 4.4 12/02/2023        No results found for: \"BNP\", \"HGBA1C\"    Last Cardiology Tests:       Cath 5/1/2020:  1. Left main: mild irregularities.  2. LAD: 30-40% mid-vessel disease.  3. LCx: mild irregularities.  4. RCA: 20-30% proximal angulated stenosis, 95% mid-vessel stenosis.  5. LVEDP 14mmHg, no aortic stenosis on LV-Ao gradient.  6. Successful PCI to severe mid-RCA stenosis with a 3.5 x 26mm Resolute ELI  post-dilated with a 3.5mm NC balloon at 22atm.     Stress echo 4/21/2020:  1. Indeterminate Stress Test: ECG portion normal, however noted to have chest  pain with peak exercise and subtle hypokinesia in the inferobasal portion)  2. The resting ejection fraction was estimated at 60% with a peak exercise  ejection fraction estimated at 60 to 65%.  3. At peak, there are stress-induced regional wall motion abnormalities (subtle  inferobasal HK with stress).  4. The adequate level of stress was achieved.    Assessment/Plan   Very pleasant 63 year-old gentleman with COPD/asthma, hypertension, asymptomatic spontaneous dissection of left carotid, CAD s/p PCI to RCA in May 2020. He complains of left arm pain and increased general fatigue. He will be set up for a nuclear stress test. Heart rate and blood pressure are well controlled today.      Plan:  Continue current ASA, amlodipine, atorvastatin, losartan, HCTZ.    Nuclear stress test   Will call to review the results       Caroline Moreland, APRN-CNP  "

## 2024-06-18 NOTE — PATIENT INSTRUCTIONS
CALL WITH ANY QUESTIONS   NO MEDICATION CHANGES   NUCLEAR STRESS TEST   WILL CALL TO REVIEW THE RESULTS

## 2024-06-28 ENCOUNTER — HOSPITAL ENCOUNTER (OUTPATIENT)
Dept: CARDIOLOGY | Facility: HOSPITAL | Age: 64
Discharge: HOME | End: 2024-06-28
Payer: COMMERCIAL

## 2024-06-28 ENCOUNTER — HOSPITAL ENCOUNTER (OUTPATIENT)
Dept: RADIOLOGY | Facility: HOSPITAL | Age: 64
Discharge: HOME | End: 2024-06-28
Payer: COMMERCIAL

## 2024-06-28 DIAGNOSIS — R07.9 CHEST PAIN, UNSPECIFIED TYPE: ICD-10-CM

## 2024-06-28 PROCEDURE — A9502 TC99M TETROFOSMIN: HCPCS | Performed by: NURSE PRACTITIONER

## 2024-06-28 PROCEDURE — 78452 HT MUSCLE IMAGE SPECT MULT: CPT

## 2024-06-28 PROCEDURE — 3430000001 HC RX 343 DIAGNOSTIC RADIOPHARMACEUTICALS: Performed by: NURSE PRACTITIONER

## 2024-06-28 PROCEDURE — 93017 CV STRESS TEST TRACING ONLY: CPT

## 2024-07-01 DIAGNOSIS — J44.9 CHRONIC OBSTRUCTIVE PULMONARY DISEASE, UNSPECIFIED COPD TYPE (MULTI): Primary | ICD-10-CM

## 2024-07-01 RX ORDER — UMECLIDINIUM BROMIDE AND VILANTEROL TRIFENATATE 62.5; 25 UG/1; UG/1
POWDER RESPIRATORY (INHALATION) DAILY
Qty: 3 EACH | Refills: 3 | Status: SHIPPED | OUTPATIENT
Start: 2024-07-01

## 2024-07-03 ENCOUNTER — TELEPHONE (OUTPATIENT)
Dept: CARDIOLOGY | Facility: HOSPITAL | Age: 64
End: 2024-07-03
Payer: COMMERCIAL

## 2024-07-03 NOTE — TELEPHONE ENCOUNTER
----- Message from KATE Jenkins sent at 6/28/2024  1:06 PM EDT -----  Please call and let him know his stress test is normal and his hear muscle is strong   Thanks   ----- Message -----  From: Interface, Radiology Results In  Sent: 6/28/2024  10:52 AM EDT  To: KATE Jenkins

## 2024-07-08 ENCOUNTER — TELEPHONE (OUTPATIENT)
Dept: PRIMARY CARE | Facility: CLINIC | Age: 64
End: 2024-07-08
Payer: COMMERCIAL

## 2024-07-08 NOTE — TELEPHONE ENCOUNTER
Pt called left a vm requesting an appt to get labs done that was recommended by cardiologist. Recently had a nuclear stress test done for exhaustion and everything came back good. Cardiologist told pt to see PCP to get blood work done to figure out what's going on.

## 2024-07-10 ENCOUNTER — OFFICE VISIT (OUTPATIENT)
Dept: PRIMARY CARE | Facility: CLINIC | Age: 64
End: 2024-07-10
Payer: COMMERCIAL

## 2024-07-10 ENCOUNTER — LAB (OUTPATIENT)
Dept: LAB | Facility: LAB | Age: 64
End: 2024-07-10
Payer: COMMERCIAL

## 2024-07-10 VITALS
BODY MASS INDEX: 24.61 KG/M2 | HEART RATE: 58 BPM | DIASTOLIC BLOOD PRESSURE: 76 MMHG | HEIGHT: 71 IN | OXYGEN SATURATION: 96 % | RESPIRATION RATE: 16 BRPM | WEIGHT: 175.8 LBS | SYSTOLIC BLOOD PRESSURE: 118 MMHG

## 2024-07-10 DIAGNOSIS — Z00.00 WELL ADULT EXAM: ICD-10-CM

## 2024-07-10 DIAGNOSIS — R40.0 DAYTIME SOMNOLENCE: ICD-10-CM

## 2024-07-10 DIAGNOSIS — I10 HYPERTENSION, UNSPECIFIED TYPE: ICD-10-CM

## 2024-07-10 DIAGNOSIS — J44.9 CHRONIC OBSTRUCTIVE PULMONARY DISEASE, UNSPECIFIED COPD TYPE (MULTI): ICD-10-CM

## 2024-07-10 DIAGNOSIS — Z12.5 SCREENING FOR PROSTATE CANCER: ICD-10-CM

## 2024-07-10 DIAGNOSIS — G47.31 CENTRAL SLEEP APNEA: ICD-10-CM

## 2024-07-10 DIAGNOSIS — Z79.899 DRUG THERAPY: ICD-10-CM

## 2024-07-10 DIAGNOSIS — I25.10 CORONARY ARTERY DISEASE INVOLVING NATIVE CORONARY ARTERY OF NATIVE HEART WITHOUT ANGINA PECTORIS: Primary | ICD-10-CM

## 2024-07-10 LAB
ALBUMIN SERPL BCP-MCNC: 4.1 G/DL (ref 3.4–5)
ALP SERPL-CCNC: 79 U/L (ref 33–136)
ALT SERPL W P-5'-P-CCNC: 60 U/L (ref 10–52)
ANION GAP SERPL CALC-SCNC: 13 MMOL/L (ref 10–20)
AST SERPL W P-5'-P-CCNC: 37 U/L (ref 9–39)
BILIRUB SERPL-MCNC: 0.5 MG/DL (ref 0–1.2)
BUN SERPL-MCNC: 23 MG/DL (ref 6–23)
CALCIUM SERPL-MCNC: 9.3 MG/DL (ref 8.6–10.3)
CHLORIDE SERPL-SCNC: 105 MMOL/L (ref 98–107)
CHOLEST SERPL-MCNC: 165 MG/DL (ref 0–199)
CHOLESTEROL/HDL RATIO: 2.5
CO2 SERPL-SCNC: 28 MMOL/L (ref 21–32)
CREAT SERPL-MCNC: 1.2 MG/DL (ref 0.5–1.3)
EGFRCR SERPLBLD CKD-EPI 2021: 69 ML/MIN/1.73M*2
ERYTHROCYTE [DISTWIDTH] IN BLOOD BY AUTOMATED COUNT: 12.9 % (ref 11.5–14.5)
GLUCOSE SERPL-MCNC: 56 MG/DL (ref 74–99)
HCT VFR BLD AUTO: 41.7 % (ref 41–52)
HDLC SERPL-MCNC: 67.1 MG/DL
HGB BLD-MCNC: 13.9 G/DL (ref 13.5–17.5)
LDLC SERPL CALC-MCNC: 70 MG/DL
MCH RBC QN AUTO: 31.1 PG (ref 26–34)
MCHC RBC AUTO-ENTMCNC: 33.3 G/DL (ref 32–36)
MCV RBC AUTO: 93 FL (ref 80–100)
NON HDL CHOLESTEROL: 98 MG/DL (ref 0–149)
NRBC BLD-RTO: 0 /100 WBCS (ref 0–0)
PLATELET # BLD AUTO: 271 X10*3/UL (ref 150–450)
POTASSIUM SERPL-SCNC: 4 MMOL/L (ref 3.5–5.3)
PROT SERPL-MCNC: 6.3 G/DL (ref 6.4–8.2)
PSA SERPL-MCNC: 0.58 NG/ML
RBC # BLD AUTO: 4.47 X10*6/UL (ref 4.5–5.9)
SODIUM SERPL-SCNC: 142 MMOL/L (ref 136–145)
T4 FREE SERPL-MCNC: 0.76 NG/DL (ref 0.61–1.12)
TESTOST SERPL-MCNC: 528 NG/DL (ref 240–1000)
TRIGL SERPL-MCNC: 138 MG/DL (ref 0–149)
TSH SERPL-ACNC: 4.49 MIU/L (ref 0.44–3.98)
VIT B12 SERPL-MCNC: 486 PG/ML (ref 211–911)
VLDL: 28 MG/DL (ref 0–40)
WBC # BLD AUTO: 8.6 X10*3/UL (ref 4.4–11.3)

## 2024-07-10 PROCEDURE — 85027 COMPLETE CBC AUTOMATED: CPT

## 2024-07-10 PROCEDURE — 84439 ASSAY OF FREE THYROXINE: CPT

## 2024-07-10 PROCEDURE — 80361 OPIATES 1 OR MORE: CPT

## 2024-07-10 PROCEDURE — 80373 DRUG SCREENING TRAMADOL: CPT

## 2024-07-10 PROCEDURE — 80346 BENZODIAZEPINES1-12: CPT

## 2024-07-10 PROCEDURE — 99214 OFFICE O/P EST MOD 30 MIN: CPT | Performed by: FAMILY MEDICINE

## 2024-07-10 PROCEDURE — 80368 SEDATIVE HYPNOTICS: CPT

## 2024-07-10 PROCEDURE — 3074F SYST BP LT 130 MM HG: CPT | Performed by: FAMILY MEDICINE

## 2024-07-10 PROCEDURE — 84153 ASSAY OF PSA TOTAL: CPT

## 2024-07-10 PROCEDURE — 80365 DRUG SCREENING OXYCODONE: CPT

## 2024-07-10 PROCEDURE — 84443 ASSAY THYROID STIM HORMONE: CPT

## 2024-07-10 PROCEDURE — 80354 DRUG SCREENING FENTANYL: CPT

## 2024-07-10 PROCEDURE — 36415 COLL VENOUS BLD VENIPUNCTURE: CPT

## 2024-07-10 PROCEDURE — 82607 VITAMIN B-12: CPT

## 2024-07-10 PROCEDURE — 82570 ASSAY OF URINE CREATININE: CPT

## 2024-07-10 PROCEDURE — 80358 DRUG SCREENING METHADONE: CPT

## 2024-07-10 PROCEDURE — 80061 LIPID PANEL: CPT

## 2024-07-10 PROCEDURE — 80053 COMPREHEN METABOLIC PANEL: CPT

## 2024-07-10 PROCEDURE — 3078F DIAST BP <80 MM HG: CPT | Performed by: FAMILY MEDICINE

## 2024-07-10 PROCEDURE — 84403 ASSAY OF TOTAL TESTOSTERONE: CPT

## 2024-07-10 PROCEDURE — 80307 DRUG TEST PRSMV CHEM ANLYZR: CPT

## 2024-07-10 RX ORDER — ARMODAFINIL 150 MG/1
150 TABLET ORAL DAILY
Qty: 30 TABLET | Refills: 5 | Status: SHIPPED | OUTPATIENT
Start: 2024-07-10 | End: 2024-07-10

## 2024-07-10 RX ORDER — ARMODAFINIL 150 MG/1
150 TABLET ORAL DAILY
Qty: 30 TABLET | Refills: 5 | Status: SHIPPED | OUTPATIENT
Start: 2024-07-10 | End: 2025-01-06

## 2024-07-10 NOTE — ASSESSMENT & PLAN NOTE
8/18/23 PT PREVENTATIVE EXAM:  BMI in normal range after 15# wt loss during year long dental work. Last colonoscopy 9/2022 next due 2027. Check labs.     Discussed taking some breakfast before exercise to prevent low sugars which might be causing leg weakness mid -run.

## 2024-07-10 NOTE — PROGRESS NOTES
"Subjective   Patient ID: Sebastián Cronin is a 63 y.o. male who presents for extreme fatigue.    Specifies -- sleepiness and tiredness despite use of BiPap.  NO loss of strength.     Able to exercise -- runs and lifts weights.  Feels rested after night's sleep on BiPap.    OARRS:  Vel Fleming MD on 7/10/2024 10:15 AM  I have personally reviewed the OARRS report for Sebastián Cronin. I have considered the risks of abuse, dependence, addiction and diversion and I believe that it is clinically appropriate for Sebastián Cronin to be prescribed this medication    Is the patient prescribed a combination of a benzodiazepine and opioid?  No    Last Urine Drug Screen / ordered today: Yes  No results found for this or any previous visit (from the past 8760 hour(s)).  Results are as expected.     Clinical rationale for not completing a Urine Drug Screen: sent 7/10/24      Controlled Substance Agreement:  Date of the Last Agreement: 7/10/24  Reviewed Controlled Substance Agreement including but not limited to the benefits, risks, and alternatives to treatment with a Controlled Substance medication(s).    Stimulants:   What is the patient's goal of therapy? Increased wakefullness  Is this being achieved with current treatment? Just starting    Activities of Daily Living:   Is your overall impression that this patient is benefiting (symptom reduction outweighs side effects) from stimulant therapy? Yes     1. Physical Functioning: Same  2. Family Relationship: Same  3. Social Relationship: Same  4. Mood: Same  5. Sleep Patterns: Same  6. Overall Function: Better    Objective   Visit Vitals  /76 (BP Location: Left arm, Patient Position: Sitting, BP Cuff Size: Large adult)   Pulse 58   Resp 16   Ht 1.803 m (5' 11\")   Wt 79.7 kg (175 lb 12.8 oz)   SpO2 96%   BMI 24.52 kg/m²   Smoking Status Former   BSA 2 m²      O: VSS AFEB Awake, Alert, NAD.  No intoxication, withdrawal, or sedation.  Chest CTA.  Heart RRR.  Ext no c/c/e.  " "    Lab Results   Component Value Date    WBC 7.3 12/02/2023    HGB 13.9 12/02/2023    HCT 43.0 12/02/2023    MCV 97 12/02/2023     12/02/2023       Chemistry    Lab Results   Component Value Date/Time     12/02/2023 1132    K 4.6 12/02/2023 1132     12/02/2023 1132    CO2 28 12/02/2023 1132    BUN 18 12/02/2023 1132    CREATININE 1.13 12/02/2023 1132    Lab Results   Component Value Date/Time    CALCIUM 9.4 12/02/2023 1132    ALKPHOS 68 12/02/2023 1132    AST 26 12/02/2023 1132    ALT 49 12/02/2023 1132    BILITOT 0.7 12/02/2023 1132          Lab Results   Component Value Date    CHOL 148 08/18/2023    CHOL 138 07/22/2022    CHOL 139 05/07/2021     Lab Results   Component Value Date    HDL 67.2 08/18/2023    HDL 55.2 07/22/2022    HDL 61.0 05/07/2021     No results found for: \"LDLCALC\"  Lab Results   Component Value Date    TRIG 43 08/18/2023    TRIG 59 07/22/2022    TRIG 48 05/07/2021     No components found for: \"CHOLHDL\"   No results found for: \"HGBA1C\"    Assessment/Plan   Problem List Items Addressed This Visit       COPD (chronic obstructive pulmonary disease) (Multi)    Overview     PULM (Mykel)   Quit smoking 1997.         Current Assessment & Plan     Stable on Anoro, albuterol.           Coronary artery disease - Primary    Overview     s/p ELI 5/1/2020.  6/28/24 Nuclear stress -- NO ISCHEMIA  CARDIO (Rikki)         Hypertension    Central sleep apnea    Overview     BiPap per PULM (Mykel)              Well adult exam    Overview     8/18/23 PT PREVENTATIVE EXAM performed         Current Assessment & Plan     8/18/23 PT PREVENTATIVE EXAM:  BMI in normal range after 15# wt loss during year long dental work. Last colonoscopy 9/2022 next due 2027. Check labs.     Discussed taking some breakfast before exercise to prevent low sugars which might be causing leg weakness mid -run.          Relevant Orders    Comprehensive metabolic panel    CBC    Lipid panel    Daytime somnolence    Overview "     Despite adequate treatment of central sleep apnea.   Trial ARMODAFANIL (nuvigil) 150 mg daily (7/10/24)         Current Assessment & Plan     OARRS reviewed 7/10/24  Agreement signed 7/10/24  Tox screen sent 7/10/24         Relevant Medications    armodafinil (Nuvigil) 150 mg tablet    Other Relevant Orders    Testosterone    Vitamin B12    Tsh With Reflex To Free T4 If Abnormal     Other Visit Diagnoses       Screening for prostate cancer        Relevant Orders    Prostate Specific Antigen, Screen    Drug therapy        Relevant Orders    Opiate/Opioid/Benzo Prescription Compliance

## 2024-07-10 NOTE — TELEPHONE ENCOUNTER
Im sorry, he called back again yesterday before I seen this and put him on for today where there was a cancellation.

## 2024-07-11 LAB
AMPHETAMINES UR QL SCN: NORMAL
BARBITURATES UR QL SCN: NORMAL
BZE UR QL SCN: NORMAL
CANNABINOIDS UR QL SCN: NORMAL
CREAT UR-MCNC: 111.9 MG/DL (ref 20–370)
PCP UR QL SCN: NORMAL

## 2024-07-12 DIAGNOSIS — E03.9 HYPOTHYROIDISM, UNSPECIFIED TYPE: Primary | ICD-10-CM

## 2024-07-12 RX ORDER — LEVOTHYROXINE SODIUM 25 UG/1
25 TABLET ORAL DAILY
Qty: 30 TABLET | Refills: 11 | Status: SHIPPED | OUTPATIENT
Start: 2024-07-12 | End: 2025-07-12

## 2024-08-06 DIAGNOSIS — R40.0 DAYTIME SOMNOLENCE: ICD-10-CM

## 2024-08-06 RX ORDER — ARMODAFINIL 250 MG/1
250 TABLET ORAL DAILY
Qty: 30 TABLET | Refills: 5 | Status: SHIPPED | OUTPATIENT
Start: 2024-08-06 | End: 2025-02-02

## 2024-09-03 ENCOUNTER — APPOINTMENT (OUTPATIENT)
Dept: PRIMARY CARE | Facility: CLINIC | Age: 64
End: 2024-09-03
Payer: COMMERCIAL

## 2024-09-03 VITALS
SYSTOLIC BLOOD PRESSURE: 120 MMHG | DIASTOLIC BLOOD PRESSURE: 58 MMHG | OXYGEN SATURATION: 96 % | HEART RATE: 67 BPM | TEMPERATURE: 98.3 F | BODY MASS INDEX: 24.79 KG/M2 | HEIGHT: 70 IN | WEIGHT: 173.2 LBS | RESPIRATION RATE: 16 BRPM

## 2024-09-03 DIAGNOSIS — I10 PRIMARY HYPERTENSION: Primary | ICD-10-CM

## 2024-09-03 DIAGNOSIS — I25.10 CORONARY ARTERY DISEASE INVOLVING NATIVE CORONARY ARTERY OF NATIVE HEART WITHOUT ANGINA PECTORIS: ICD-10-CM

## 2024-09-03 DIAGNOSIS — R20.2 PARESTHESIAS: ICD-10-CM

## 2024-09-03 DIAGNOSIS — Z00.00 WELL ADULT EXAM: ICD-10-CM

## 2024-09-03 DIAGNOSIS — E03.9 HYPOTHYROIDISM, UNSPECIFIED TYPE: ICD-10-CM

## 2024-09-03 DIAGNOSIS — R40.0 DAYTIME SOMNOLENCE: ICD-10-CM

## 2024-09-03 DIAGNOSIS — J44.1 CHRONIC OBSTRUCTIVE PULMONARY DISEASE WITH ACUTE EXACERBATION (MULTI): ICD-10-CM

## 2024-09-03 DIAGNOSIS — G47.31 CENTRAL SLEEP APNEA: ICD-10-CM

## 2024-09-03 DIAGNOSIS — Z23 NEED FOR DIPHTHERIA-TETANUS-PERTUSSIS (TDAP) VACCINE: ICD-10-CM

## 2024-09-03 PROBLEM — E03.8 SUBCLINICAL HYPOTHYROIDISM: Status: ACTIVE | Noted: 2024-09-03

## 2024-09-03 RX ORDER — ARMODAFINIL 250 MG/1
250 TABLET ORAL DAILY
Qty: 90 TABLET | Refills: 1 | Status: SHIPPED | OUTPATIENT
Start: 2024-09-03 | End: 2025-03-02

## 2024-09-03 RX ORDER — ALBUTEROL SULFATE 90 UG/1
2 INHALANT RESPIRATORY (INHALATION) EVERY 6 HOURS PRN
Qty: 3 G | Refills: 3 | Status: SHIPPED | OUTPATIENT
Start: 2024-09-03 | End: 2024-12-02

## 2024-09-03 ASSESSMENT — ENCOUNTER SYMPTOMS
BLOOD IN STOOL: 0
ADENOPATHY: 0
POLYDIPSIA: 0
FATIGUE: 0
DYSURIA: 0
PALPITATIONS: 0
BRUISES/BLEEDS EASILY: 0
HEADACHES: 0
WEAKNESS: 0
EYE REDNESS: 0
SHORTNESS OF BREATH: 0
SORE THROAT: 0
EYE PAIN: 0
NECK STIFFNESS: 0
HEMATURIA: 0
HALLUCINATIONS: 0
NUMBNESS: 1
RHINORRHEA: 0
LOSS OF SENSATION IN FEET: 0
BACK PAIN: 0
FEVER: 0
CHILLS: 0
OCCASIONAL FEELINGS OF UNSTEADINESS: 0
WOUND: 0
ABDOMINAL PAIN: 0
COUGH: 0
DEPRESSION: 0

## 2024-09-03 NOTE — ASSESSMENT & PLAN NOTE
9/3/24 PREVENTATIVE EXAM:  BMI in normal range after 15# wt loss during year during invisilign.  Last colonoscopy 9/2022 next due 2027. Check labs annually.     Tdap 9/3/24  Encouraged to get RSV and COVID at pharmacy.     Discussed taking some breakfast before exercise to prevent low sugars which might be causing leg weakness mid -run.

## 2024-09-03 NOTE — ASSESSMENT & PLAN NOTE
B/L hands left worse than right.   + Tinel's at left wrist.   Negative Phalen's.  Counseled on treatment for carpal tunnel.

## 2024-09-03 NOTE — PROGRESS NOTES
Sebastián Cronin is a 63 y.o. male with Chief Complaint of Annual Exam.  And 2 month follow up on fatigue.   Feeling better on 250 mg armodafanil.     OARRS:  Vel Fleming MD on 9/3/2024 11:07 AM  I have personally reviewed the OARRS report for Sebastián Cronin. I have considered the risks of abuse, dependence, addiction and diversion and I believe that it is clinically appropriate for Sebastián Cronin to be prescribed this medication    Is the patient prescribed a combination of a benzodiazepine and opioid?  No    Last Urine Drug Screen / ordered today: Yes  Recent Results (from the past 8760 hour(s))   Confirmation Opiate/Opioid/Benzo Prescription Compliance    Collection Time: 07/10/24 10:27 AM   Result Value Ref Range    Clonazepam <25 <25 ng/mL    7-Aminoclonazepam <25 <25 ng/mL    Alprazolam <25 <25 ng/mL    Alpha-Hydroxyalprazolam <25 <25 ng/mL    Midazolam <25 <25 ng/mL    Alpha-Hydroxymidazolam <25 <25 ng/mL    Chlordiazepoxide <25 <25 ng/mL    Diazepam <25 <25 ng/mL    Nordiazepam <25 <25 ng/mL    Temazepam <25 <25 ng/mL    Oxazepam <25 <25 ng/mL    Lorazepam <25 <25 ng/mL    Methadone <25 <25 ng/mL    EDDP <25 <25 ng/mL    6-Acetylmorphine <25 <25 ng/mL    Codeine <50 <50 ng/mL    Hydrocodone <25 <25 ng/mL    Hydromorphone <25 <25 ng/mL    Morphine  <50 <50 ng/mL    Norhydrocodone <25 <25 ng/mL    Noroxycodone <25 <25 ng/mL    Oxycodone <25 <25 ng/mL    Oxymorphone <25 <25 ng/mL    Fentanyl <2.5 <2.5 ng/mL    Norfentanyl <2.5 <2.5 ng/mL    Tramadol <50 <50 ng/mL    O-Desmethyltramadol <50 <50 ng/mL    Zolpidem <25 <25 ng/mL    Zolpidem Metabolite (ZCA) <25 <25 ng/mL   Screen Opiate/Opioid/Benzo Prescription Compliance    Collection Time: 07/10/24 10:27 AM   Result Value Ref Range    Creatinine, Urine Random 111.9 20.0 - 370.0 mg/dL    Amphetamine Screen, Urine Presumptive Negative Presumptive Negative    Barbiturate Screen, Urine Presumptive Negative Presumptive Negative    Cannabinoid Screen, Urine  Presumptive Negative Presumptive Negative    Cocaine Metabolite Screen, Urine Presumptive Negative Presumptive Negative    PCP Screen, Urine Presumptive Negative Presumptive Negative     Results are as expected.         Controlled Substance Agreement:  Date of the Last Agreement: 7/10/24  Reviewed Controlled Substance Agreement including but not limited to the benefits, risks, and alternatives to treatment with a Controlled Substance medication(s).    Stimulants:   What is the patient's goal of therapy? Increased energy  Is this being achieved with current treatment? yes    Activities of Daily Living:   Is your overall impression that this patient is benefiting (symptom reduction outweighs side effects) from stimulant therapy? Yes     1. Physical Functioning: Better  2. Family Relationship: Same  3. Social Relationship: Same  4. Mood: Same  5. Sleep Patterns: Same  6. Overall Function: Better    Past Surgical History:   Procedure Laterality Date    COLONOSCOPY  02/14/2017    Complete Colonoscopy    TONSILLECTOMY  08/14/2013    Tonsillectomy      Social History     Socioeconomic History    Marital status:      Spouse name: Not on file    Number of children: Not on file    Years of education: Not on file    Highest education level: Not on file   Occupational History    Not on file   Tobacco Use    Smoking status: Former     Types: Cigarettes     Passive exposure: Past    Smokeless tobacco: Never   Vaping Use    Vaping status: Never Used   Substance and Sexual Activity    Alcohol use: Not Currently    Drug use: Never    Sexual activity: Yes     Partners: Female   Other Topics Concern    Not on file   Social History Narrative    Not on file     Social Determinants of Health     Financial Resource Strain: Not on file   Food Insecurity: Not on file   Transportation Needs: Not on file   Physical Activity: Not on file   Stress: Not on file   Social Connections: Not on file   Intimate Partner Violence: Not on file  "  Housing Stability: Not on file     Past Medical History:   Diagnosis Date    Acute upper respiratory infection, unspecified 08/24/2015    URI, acute    Lateral epicondylitis, unspecified elbow 04/01/2015    Lateral epicondylitis    Olecranon bursitis 04/05/2023    Other specified disorders of Eustachian tube, unspecified ear 02/08/2016    Eustachian tube dysfunction    Pain in unspecified shoulder 07/09/2014    Shoulder pain    Personal history of other diseases of the respiratory system 03/05/2020    History of pleurisy    Personal history of other diseases of the respiratory system 12/26/2014    History of acute sinusitis    Personal history of other diseases of the respiratory system 06/15/2016    History of allergic rhinitis      No family history on file.     Review of Systems   Constitutional:  Negative for chills, fatigue and fever.   HENT:  Negative for rhinorrhea and sore throat.    Eyes:  Negative for pain and redness.   Respiratory:  Negative for cough and shortness of breath.    Cardiovascular:  Negative for chest pain and palpitations.   Gastrointestinal:  Negative for abdominal pain and blood in stool.   Endocrine: Negative for polydipsia and polyuria.   Genitourinary:  Negative for dysuria and hematuria.   Musculoskeletal:  Negative for back pain and neck stiffness.   Skin:  Negative for rash and wound.   Allergic/Immunologic: Negative for environmental allergies and food allergies.   Neurological:  Positive for numbness. Negative for weakness and headaches.   Hematological:  Negative for adenopathy. Does not bruise/bleed easily.   Psychiatric/Behavioral:  Negative for hallucinations and suicidal ideas.       /58 (BP Location: Left arm, Patient Position: Sitting, BP Cuff Size: Adult)   Pulse 67   Temp 36.8 °C (98.3 °F) (Temporal)   Resp 16   Ht 1.778 m (5' 10\")   Wt 78.6 kg (173 lb 3.2 oz)   SpO2 96%   BMI 24.85 kg/m²   Physical Exam  Vitals reviewed.   Constitutional:       General: He " is not in acute distress.     Appearance: He is not ill-appearing.   HENT:      Head: Normocephalic and atraumatic.      Right Ear: Tympanic membrane normal.      Left Ear: Tympanic membrane normal.      Nose: No congestion or rhinorrhea.      Mouth/Throat:      Pharynx: No oropharyngeal exudate or posterior oropharyngeal erythema.   Eyes:      Extraocular Movements: Extraocular movements intact.      Conjunctiva/sclera: Conjunctivae normal.      Pupils: Pupils are equal, round, and reactive to light.   Cardiovascular:      Rate and Rhythm: Normal rate and regular rhythm.      Heart sounds: No murmur heard.     No friction rub. No gallop.   Pulmonary:      Effort: Pulmonary effort is normal.      Breath sounds: Normal breath sounds. No wheezing, rhonchi or rales.   Abdominal:      General: There is no distension.      Palpations: Abdomen is soft.      Tenderness: There is no abdominal tenderness. There is no guarding or rebound.   Musculoskeletal:         General: No swelling or deformity.      Cervical back: Normal range of motion and neck supple.      Right lower leg: No edema.      Left lower leg: No edema.   Skin:     Capillary Refill: Capillary refill takes less than 2 seconds.      Coloration: Skin is not jaundiced.      Findings: No rash.   Neurological:      General: No focal deficit present.      Mental Status: He is alert.      Motor: No weakness.   Psychiatric:         Mood and Affect: Mood normal.         Behavior: Behavior normal.       +Tinel's left wrist    Lab Results   Component Value Date    WBC 8.6 07/10/2024    HGB 13.9 07/10/2024    HCT 41.7 07/10/2024    MCV 93 07/10/2024     07/10/2024     Lab Results   Component Value Date    CHOL 165 07/10/2024    CHOL 148 08/18/2023    CHOL 138 07/22/2022     Lab Results   Component Value Date    HDL 67.1 07/10/2024    HDL 67.2 08/18/2023    HDL 55.2 07/22/2022     Lab Results   Component Value Date    LDLCALC 70 07/10/2024     Lab Results  "  Component Value Date    TRIG 138 07/10/2024    TRIG 43 08/18/2023    TRIG 59 07/22/2022     No components found for: \"CHOLHDL\"  No results found for: \"HGBA1C\"    Assessment/Plan   Problem List Items Addressed This Visit       COPD (chronic obstructive pulmonary disease) (Multi)    Overview     PULM (Mykel)   Quit smoking 1997.         Current Assessment & Plan     Stable on Anoro, albuterol.    Continue these.          Relevant Medications    albuterol 90 mcg/actuation inhaler    Coronary artery disease    Overview     s/p ELI 5/1/2020.  6/28/24 Nuclear stress -- NO ISCHEMIA  CARDIO (Rikki)         Current Assessment & Plan     Stable on statin, asa, plavix.          Hypertension - Primary    Current Assessment & Plan     Stable on losartan 100 and amlodpine 5 mg dailu.          Central sleep apnea    Overview     BiPap per PULM (Mykel)              Current Assessment & Plan     Stable on BiPap.         Well adult exam    Overview     9/3/24 PREVENTATIVE EXAM performed         Current Assessment & Plan     9/3/24 PREVENTATIVE EXAM:  BMI in normal range after 15# wt loss during year during invisilign.  Last colonoscopy 9/2022 next due 2027. Check labs annually.     Tdap 9/3/24  Encouraged to get RSV and COVID at pharmacy.     Discussed taking some breakfast before exercise to prevent low sugars which might be causing leg weakness mid -run.          Daytime somnolence    Overview     Despite adequate treatment of central sleep apnea.   Tolerating ARMODAFANIL (nuvigil) 150 mg daily (7/10/24) upped to 250 mg 8/6/24.         Current Assessment & Plan     OARRS reviewed 7/10/24, 9/3/24  Agreement signed 7/10/24  Tox screen sent 7/10/24         Relevant Medications    armodafinil (Nuvigil) 250 mg tablet    Paresthesias    Overview     Onset 8/2024         Current Assessment & Plan     B/L hands left worse than right.   + Tinel's at left wrist.   Negative Phalen's.  Counseled on treatment for carpal tunnel.        "     Other Visit Diagnoses       Hypothyroidism, unspecified type        Need for diphtheria-tetanus-pertussis (Tdap) vaccine        Relevant Orders    Tdap vaccine, age 7 years and older

## 2024-10-07 ENCOUNTER — APPOINTMENT (OUTPATIENT)
Dept: PULMONOLOGY | Facility: CLINIC | Age: 64
End: 2024-10-07
Payer: COMMERCIAL

## 2024-10-11 ENCOUNTER — LAB (OUTPATIENT)
Dept: LAB | Facility: LAB | Age: 64
End: 2024-10-11
Payer: COMMERCIAL

## 2024-10-11 DIAGNOSIS — E03.9 HYPOTHYROIDISM, UNSPECIFIED TYPE: ICD-10-CM

## 2024-10-11 LAB
T4 FREE SERPL-MCNC: 0.75 NG/DL (ref 0.61–1.12)
THYROPEROXIDASE AB SERPL-ACNC: <28 IU/ML
TSH SERPL-ACNC: 5.98 MIU/L (ref 0.44–3.98)

## 2024-10-11 PROCEDURE — 36415 COLL VENOUS BLD VENIPUNCTURE: CPT

## 2024-10-11 PROCEDURE — 84443 ASSAY THYROID STIM HORMONE: CPT

## 2024-10-11 PROCEDURE — 86376 MICROSOMAL ANTIBODY EACH: CPT

## 2024-10-11 PROCEDURE — 84439 ASSAY OF FREE THYROXINE: CPT

## 2024-10-13 ENCOUNTER — OFFICE VISIT (OUTPATIENT)
Dept: URGENT CARE | Age: 64
End: 2024-10-13
Payer: COMMERCIAL

## 2024-10-13 VITALS
WEIGHT: 164 LBS | DIASTOLIC BLOOD PRESSURE: 80 MMHG | SYSTOLIC BLOOD PRESSURE: 161 MMHG | HEART RATE: 63 BPM | TEMPERATURE: 97.6 F | BODY MASS INDEX: 23.53 KG/M2 | RESPIRATION RATE: 16 BRPM | OXYGEN SATURATION: 97 %

## 2024-10-13 DIAGNOSIS — M79.661 PAIN OF RIGHT CALF: Primary | ICD-10-CM

## 2024-10-13 PROCEDURE — 3079F DIAST BP 80-89 MM HG: CPT | Performed by: REGISTERED NURSE

## 2024-10-13 PROCEDURE — 99203 OFFICE O/P NEW LOW 30 MIN: CPT | Performed by: REGISTERED NURSE

## 2024-10-13 PROCEDURE — 3077F SYST BP >= 140 MM HG: CPT | Performed by: REGISTERED NURSE

## 2024-10-13 ASSESSMENT — ENCOUNTER SYMPTOMS
FEVER: 0
ARTHRALGIAS: 1
CHEST TIGHTNESS: 0
CHILLS: 0
COUGH: 0
SHORTNESS OF BREATH: 0

## 2024-10-13 NOTE — PROGRESS NOTES
Subjective   Patient ID: Sebastián Cronin is a 63 y.o. male. They present today with a chief complaint of Injury (Patient here with injury to R calf. 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 Bruising and very sensitive to touch.).    History of Present Illness    History provided by:  Patient  History limited by: na.   used: No    Injury  Location:  Right calf  Quality:  Aching  Severity:  Moderate  Onset quality:  Gradual  Duration:  9 days  Timing:  Constant  Progression:  Unchanged  Chronicity:  New  Context:  Kicked in the right calf  Relieved by:  Rest  Worsened by:  Exercise  Ineffective treatments:  Rest  Associated symptoms: no chest pain, no cough, no fever and no shortness of breath        Past Medical History  Allergies as of 10/13/2024 - Reviewed 10/13/2024   Allergen Reaction Noted    Diphenhydramine Swelling 04/05/2023    Antihistamine-1 Swelling 11/11/2003       (Not in a hospital admission)       Past Medical History:   Diagnosis Date    Acute upper respiratory infection, unspecified 08/24/2015    URI, acute    Lateral epicondylitis, unspecified elbow 04/01/2015    Lateral epicondylitis    Olecranon bursitis 04/05/2023    Other specified disorders of Eustachian tube, unspecified ear 02/08/2016    Eustachian tube dysfunction    Pain in unspecified shoulder 07/09/2014    Shoulder pain    Personal history of other diseases of the respiratory system 03/05/2020    History of pleurisy    Personal history of other diseases of the respiratory system 12/26/2014    History of acute sinusitis    Personal history of other diseases of the respiratory system 06/15/2016    History of allergic rhinitis       Past Surgical History:   Procedure Laterality Date    COLONOSCOPY  02/14/2017    Complete Colonoscopy    TONSILLECTOMY  08/14/2013    Tonsillectomy        reports that he has quit smoking. His smoking use included cigarettes. He has been exposed to tobacco smoke. He has never  used smokeless tobacco. He reports that he does not currently use alcohol. He reports that he does not use drugs.    Review of Systems  Review of Systems   Constitutional:  Negative for chills and fever.   Respiratory:  Negative for cough, chest tightness and shortness of breath.    Cardiovascular:  Negative for chest pain.   Musculoskeletal:  Positive for arthralgias and gait problem.        Pain and swelling to right calf and bruising to right ankle                                  Objective    Vitals:    10/13/24 1749   BP: 161/80   BP Location: Right arm   Pulse: 63   Resp: 16   Temp: 36.4 °C (97.6 °F)   SpO2: 97%   Weight: 74.4 kg (164 lb)     No LMP for male patient.    Physical Exam  Vitals and nursing note reviewed.   Cardiovascular:      Rate and Rhythm: Normal rate. Frequent Extrasystoles are present.  Pulmonary:      Effort: Pulmonary effort is normal.      Breath sounds: Normal breath sounds.   Musculoskeletal:      Right lower leg: Swelling and tenderness present.        Legs:          Procedures    Point of Care Test & Imaging Results from this visit  No results found for this visit on 10/13/24.   No results found.    Diagnostic study results (if any) were reviewed by Crystal L Severino, APRN-CNP.    Assessment/Plan   Allergies, medications, history, and pertinent labs/EKGs/Imaging reviewed by Crystal L Severino, APRN-CNP.     Medical Decision Making  63-year-old male patient presents with complaints of right calf pain and swelling with swelling that radiates down to both sides of his ankle with bruising.  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  He states since the original injury he has continued to be very active with running daily, refereeing hockey games and daily activities.  He denies any SOB, CP or difficulty with ambulation, numbness or tingling.  Patient states he is just concerned that the bruising has gone into his foot and that  his calf still remains swollen.  Negative Alicia's sign, BBS CTA  I explained to patient that there is always the possibility of having a blood clot in his calf area and the only way to rule this out is by having an ultrasound.  I explained to patient and wife I can order the ultrasound and he can go tomorrow sometime to a  facility and have this test performed.  Both patient and wife states this is what they would like to do; order for right lower extremity ultrasound is placed and paperwork is given to patient  Patient is discharged to home and will go tomorrow for his ultrasound.  Patient is instructed on the [facility] plan of care, patient verbalizes understanding has no further questions at this time    Orders and Diagnoses  Diagnoses and all orders for this visit:  Pain of right calf  -     Vascular US Lower Extremity Venous Duplex Right; Future      Medical Admin Record      Patient disposition: Home    Electronically signed by Crystal L Severino, APRN-CNP  6:00 PM

## 2024-10-15 ENCOUNTER — TELEPHONE (OUTPATIENT)
Dept: PRIMARY CARE | Facility: CLINIC | Age: 64
End: 2024-10-15
Payer: COMMERCIAL

## 2024-10-15 DIAGNOSIS — S89.91XA INJURY OF RIGHT LOWER EXTREMITY, INITIAL ENCOUNTER: ICD-10-CM

## 2024-10-15 NOTE — TELEPHONE ENCOUNTER
Pt wife called asking f he should be seen? they both went to the  on Sunday for extreme calf pain he was in a hockey game and was kicked in his calf he is still have a lot of pain that is going down his leg on both sides, Carla stated that they didn't do a US and she is worried it could be a clot. Do you want me to put him on the schedule for today or just order a US

## 2024-10-16 ENCOUNTER — HOSPITAL ENCOUNTER (OUTPATIENT)
Dept: VASCULAR MEDICINE | Facility: HOSPITAL | Age: 64
Discharge: HOME | End: 2024-10-16
Payer: COMMERCIAL

## 2024-10-16 DIAGNOSIS — S89.91XA INJURY OF RIGHT LOWER EXTREMITY, INITIAL ENCOUNTER: ICD-10-CM

## 2024-10-16 DIAGNOSIS — R60.0 LOCALIZED EDEMA: ICD-10-CM

## 2024-10-16 PROCEDURE — 93971 EXTREMITY STUDY: CPT | Performed by: SURGERY

## 2024-10-16 PROCEDURE — 93971 EXTREMITY STUDY: CPT

## 2024-10-21 ENCOUNTER — OFFICE VISIT (OUTPATIENT)
Dept: PULMONOLOGY | Facility: CLINIC | Age: 64
End: 2024-10-21
Payer: COMMERCIAL

## 2024-10-21 VITALS
WEIGHT: 170.9 LBS | DIASTOLIC BLOOD PRESSURE: 67 MMHG | OXYGEN SATURATION: 95 % | SYSTOLIC BLOOD PRESSURE: 111 MMHG | BODY MASS INDEX: 24.52 KG/M2 | HEART RATE: 63 BPM | RESPIRATION RATE: 16 BRPM

## 2024-10-21 DIAGNOSIS — J44.9 CHRONIC OBSTRUCTIVE PULMONARY DISEASE, UNSPECIFIED COPD TYPE (MULTI): Primary | ICD-10-CM

## 2024-10-21 DIAGNOSIS — J44.9 CHRONIC OBSTRUCTIVE PULMONARY DISEASE, UNSPECIFIED COPD TYPE (MULTI): ICD-10-CM

## 2024-10-21 DIAGNOSIS — G47.31 CENTRAL SLEEP APNEA: ICD-10-CM

## 2024-10-21 PROCEDURE — 1036F TOBACCO NON-USER: CPT | Performed by: INTERNAL MEDICINE

## 2024-10-21 PROCEDURE — 99213 OFFICE O/P EST LOW 20 MIN: CPT | Performed by: INTERNAL MEDICINE

## 2024-10-21 PROCEDURE — 3078F DIAST BP <80 MM HG: CPT | Performed by: INTERNAL MEDICINE

## 2024-10-21 PROCEDURE — 3074F SYST BP LT 130 MM HG: CPT | Performed by: INTERNAL MEDICINE

## 2024-10-21 RX ORDER — LEVOTHYROXINE SODIUM 25 UG/1
25 TABLET ORAL DAILY
COMMUNITY
Start: 2024-10-11

## 2024-10-21 ASSESSMENT — ENCOUNTER SYMPTOMS
CHILLS: 0
CARDIOVASCULAR NEGATIVE: 1
PSYCHIATRIC NEGATIVE: 1
NEUROLOGICAL NEGATIVE: 1
COUGH: 1
GASTROINTESTINAL NEGATIVE: 1
FEVER: 0

## 2024-10-21 ASSESSMENT — PATIENT HEALTH QUESTIONNAIRE - PHQ9
2. FEELING DOWN, DEPRESSED OR HOPELESS: NOT AT ALL
1. LITTLE INTEREST OR PLEASURE IN DOING THINGS: NOT AT ALL
SUM OF ALL RESPONSES TO PHQ9 QUESTIONS 1 AND 2: 0

## 2024-10-21 ASSESSMENT — PAIN SCALES - GENERAL: PAINLEVEL_OUTOF10: 0-NO PAIN

## 2024-10-21 NOTE — ASSESSMENT & PLAN NOTE
Mild obstruction (4/2022). Cont Anoro. Cont albuterol.  Repeat PFT before next visit.    Chronic cough that improved w/ prilosec previously.

## 2024-10-21 NOTE — PROGRESS NOTES
Subjective   Patient ID: Sebastián Cronin is a 63 y.o. male who presents for COPD.  H/o COPD and Central Sleep Apnea (on BIPAP) here for follow-up appointment. Mild obstruction (4/2022). Thinks breathing is ok. No fevers, chills.  Occasional cough. On Anoro. Only uses rescue before exercise. ET is unrestricted.         Review of Systems   Constitutional:  Negative for chills and fever.   Respiratory:  Positive for cough.    Cardiovascular: Negative.    Gastrointestinal: Negative.    Skin:  Negative for rash.   Neurological: Negative.    Psychiatric/Behavioral: Negative.     All other systems reviewed and are negative.      Objective   Physical Exam  Vitals reviewed.   Constitutional:       Appearance: Normal appearance.   HENT:      Head: Normocephalic and atraumatic.   Eyes:      Extraocular Movements: Extraocular movements intact.   Cardiovascular:      Rate and Rhythm: Normal rate and regular rhythm.      Heart sounds: Normal heart sounds.   Pulmonary:      Effort: Pulmonary effort is normal.      Breath sounds: Normal breath sounds.   Abdominal:      Palpations: Abdomen is soft.      Tenderness: There is no abdominal tenderness.   Musculoskeletal:      Cervical back: Normal range of motion.   Skin:     General: Skin is warm.   Neurological:      General: No focal deficit present.      Mental Status: He is alert and oriented to person, place, and time. Mental status is at baseline.   Psychiatric:         Mood and Affect: Mood normal.         Behavior: Behavior normal.         Assessment/Plan   Problem List Items Addressed This Visit       COPD (chronic obstructive pulmonary disease) (Multi) - Primary     Mild obstruction (4/2022). Cont Anoro. Cont albuterol.  Repeat PFT before next visit.    Chronic cough that improved w/ prilosec previously.         Central sleep apnea     Cont BIPAP 15/8.  Pt wears 87% of the time for 2x47wte on avg w/ AHI 3.  Pt is using and  benefiting from his machine.  Check compliance next  visit.          RTC in 1 year    Time Spent  Prep time on day of patient encounter: 5 minutes  Time spent directly with patient, family or caregiver: 10 minutes  Additional Time Spent on Patient Care Activities: 0 minutes  Documentation Time: 5 minutes  Other Time Spent: 0 minutes  Total: 20 minutes        Oniel Hogue MD 10/21/24 3:22 PM

## 2024-10-21 NOTE — ASSESSMENT & PLAN NOTE
Cont BIPAP 15/8.  Pt wears 87% of the time for [id number] on avg w/ AHI 3.  Pt is using and  benefiting from his machine.  Check compliance next visit.

## 2025-03-03 DIAGNOSIS — R40.0 DAYTIME SOMNOLENCE: ICD-10-CM

## 2025-03-03 RX ORDER — ARMODAFINIL 250 MG/1
250 TABLET ORAL DAILY
Qty: 90 TABLET | Refills: 1 | Status: SHIPPED | OUTPATIENT
Start: 2025-03-03

## 2025-03-06 DIAGNOSIS — M79.605 PAIN OF LEFT LOWER EXTREMITY: Primary | ICD-10-CM

## 2025-04-02 DIAGNOSIS — N52.9 MALE ERECTILE DISORDER OF ORGANIC ORIGIN: ICD-10-CM

## 2025-04-02 DIAGNOSIS — E78.5 DYSLIPIDEMIA: ICD-10-CM

## 2025-04-02 DIAGNOSIS — I25.10 CORONARY ARTERY DISEASE INVOLVING NATIVE HEART, UNSPECIFIED VESSEL OR LESION TYPE, UNSPECIFIED WHETHER ANGINA PRESENT: ICD-10-CM

## 2025-04-02 RX ORDER — SILDENAFIL 100 MG/1
TABLET, FILM COATED ORAL
Qty: 30 TABLET | Refills: 3 | Status: SHIPPED | OUTPATIENT
Start: 2025-04-02

## 2025-04-02 RX ORDER — ATORVASTATIN CALCIUM 20 MG/1
20 TABLET, FILM COATED ORAL NIGHTLY
Qty: 90 TABLET | Refills: 3 | Status: SHIPPED | OUTPATIENT
Start: 2025-04-02

## 2025-04-18 DIAGNOSIS — I10 HYPERTENSION, UNSPECIFIED TYPE: ICD-10-CM

## 2025-04-18 RX ORDER — LOSARTAN POTASSIUM 100 MG/1
100 TABLET ORAL DAILY
Qty: 90 TABLET | Refills: 3 | Status: SHIPPED | OUTPATIENT
Start: 2025-04-18

## 2025-04-23 DIAGNOSIS — I10 HYPERTENSION, UNSPECIFIED TYPE: ICD-10-CM

## 2025-04-23 RX ORDER — AMLODIPINE BESYLATE 5 MG/1
5 TABLET ORAL DAILY
Qty: 90 TABLET | Refills: 3 | Status: SHIPPED | OUTPATIENT
Start: 2025-04-23

## 2025-05-08 ENCOUNTER — OFFICE VISIT (OUTPATIENT)
Dept: CARDIOLOGY | Facility: HOSPITAL | Age: 65
End: 2025-05-08
Payer: COMMERCIAL

## 2025-05-08 VITALS
HEART RATE: 63 BPM | WEIGHT: 175.71 LBS | DIASTOLIC BLOOD PRESSURE: 85 MMHG | BODY MASS INDEX: 25.21 KG/M2 | OXYGEN SATURATION: 96 % | SYSTOLIC BLOOD PRESSURE: 147 MMHG

## 2025-05-08 DIAGNOSIS — I25.10 CORONARY ARTERY DISEASE INVOLVING NATIVE CORONARY ARTERY OF NATIVE HEART, UNSPECIFIED WHETHER ANGINA PRESENT: ICD-10-CM

## 2025-05-08 DIAGNOSIS — I10 HYPERTENSION, UNSPECIFIED TYPE: Primary | ICD-10-CM

## 2025-05-08 DIAGNOSIS — E78.5 DYSLIPIDEMIA: ICD-10-CM

## 2025-05-08 LAB
ATRIAL RATE: 67 BPM
P AXIS: 56 DEGREES
P OFFSET: 199 MS
P ONSET: 150 MS
PR INTERVAL: 146 MS
Q ONSET: 223 MS
QRS COUNT: 10 BEATS
QRS DURATION: 78 MS
QT INTERVAL: 418 MS
QTC CALCULATION(BAZETT): 441 MS
QTC FREDERICIA: 434 MS
R AXIS: 78 DEGREES
T AXIS: 45 DEGREES
T OFFSET: 432 MS
VENTRICULAR RATE: 67 BPM

## 2025-05-08 PROCEDURE — 99214 OFFICE O/P EST MOD 30 MIN: CPT | Performed by: INTERNAL MEDICINE

## 2025-05-08 PROCEDURE — 3077F SYST BP >= 140 MM HG: CPT | Performed by: INTERNAL MEDICINE

## 2025-05-08 PROCEDURE — 3079F DIAST BP 80-89 MM HG: CPT | Performed by: INTERNAL MEDICINE

## 2025-05-08 PROCEDURE — 1036F TOBACCO NON-USER: CPT | Performed by: INTERNAL MEDICINE

## 2025-05-08 PROCEDURE — 93005 ELECTROCARDIOGRAM TRACING: CPT | Performed by: INTERNAL MEDICINE

## 2025-05-08 RX ORDER — LOSARTAN POTASSIUM AND HYDROCHLOROTHIAZIDE 25; 100 MG/1; MG/1
1 TABLET ORAL DAILY
Qty: 90 TABLET | Refills: 3 | Status: SHIPPED | OUTPATIENT
Start: 2025-05-08 | End: 2026-05-08

## 2025-05-08 NOTE — PROGRESS NOTES
Chief Complaint:   No chief complaint on file.     History Of Present Illness:    Sebastián Cronin is a 64 y.o. male presenting for follow-up.  Overall doing well from a cardiac standpoint.  Ran 5.3 miles this morning--does note some dyspnea on exertion, i.e. a little more short of breath when running them compared to the past, no angina.  Compliant with medications.  Does note some trivial lower extremity edema Home blood pressures usually in the 120 systolic.     Last Recorded Vitals:  Vitals:    05/08/25 0806   BP: 147/85   Pulse: 63   SpO2: 96%   Weight: 79.7 kg (175 lb 11.3 oz)       Past Medical History:  He has a past medical history of Acute upper respiratory infection, unspecified (08/24/2015), Lateral epicondylitis, unspecified elbow (04/01/2015), Olecranon bursitis (04/05/2023), Other specified disorders of Eustachian tube, unspecified ear (02/08/2016), Pain in unspecified shoulder (07/09/2014), Personal history of other diseases of the respiratory system (03/05/2020), Personal history of other diseases of the respiratory system (12/26/2014), and Personal history of other diseases of the respiratory system (06/15/2016).    Past Surgical History:  He has a past surgical history that includes Tonsillectomy (08/14/2013) and Colonoscopy (02/14/2017).      Social History:  He reports that he has quit smoking. His smoking use included cigarettes. He has been exposed to tobacco smoke. He has never used smokeless tobacco. He reports that he does not currently use alcohol. He reports that he does not use drugs.    Family History:  Family History[1]     Allergies:  Diphenhydramine and Antihistamine-1    Outpatient Medications:  Current Outpatient Medications   Medication Instructions    albuterol 90 mcg/actuation inhaler 2 puffs, inhalation, Every 6 hours PRN    amLODIPine (NORVASC) 5 mg, oral, Daily    armodafinil (NUVIGIL) 250 mg, oral, Daily    aspirin 81 mg EC tablet 1 tablet, Daily    atorvastatin (LIPITOR) 20  mg, oral, Nightly    azelastine (Astelin) 137 mcg (0.1 %) nasal spray 2 sprays, Each Nostril, 2 times daily    clopidogrel (PLAVIX) 75 mg, Daily    fluticasone (Flonase) 50 mcg/actuation nasal spray 1 spray, Daily    hydroCHLOROthiazide (MICROZIDE) 12.5 mg, oral, Daily    levothyroxine (SYNTHROID, LEVOXYL) 25 mcg, Daily    losartan (COZAAR) 100 mg, oral, Daily    montelukast (SINGULAIR) 10 mg, oral, Nightly    sildenafil (Viagra) 100 mg tablet TAKE 1 TABLET IF NEEDED FOR ERECTILE DYSFUNCTION    umeclidinium-vilanteroL (Anoro Ellipta) 62.5-25 mcg/actuation blister with device inhalation, Daily       Physical Exam:  Constitutional:       Appearance: Healthy appearance. Not in distress.   Neck:      Vascular: No JVR. JVD normal.   Pulmonary:      Effort: Pulmonary effort is normal.      Breath sounds: Normal breath sounds. No wheezing. No rhonchi. No rales.   Chest:      Chest wall: Not tender to palpatation.   Cardiovascular:      Normal rate. Regular rhythm.      Murmurs: There is no murmur.   Edema:     Peripheral edema absent.   Abdominal:      General: Bowel sounds are normal.      Palpations: Abdomen is soft.      Tenderness: There is no abdominal tenderness.   Musculoskeletal: Normal range of motion. Skin:     General: Skin is warm and dry.   Neurological:      General: No focal deficit present.      Mental Status: Alert and oriented to person, place and time.           Last Labs:  CBC -  Lab Results   Component Value Date    WBC 8.6 07/10/2024    HGB 13.9 07/10/2024    HCT 41.7 07/10/2024    MCV 93 07/10/2024     07/10/2024       CMP -  Lab Results   Component Value Date    CALCIUM 9.3 07/10/2024    PROT 6.3 (L) 07/10/2024    ALBUMIN 4.1 07/10/2024    AST 37 07/10/2024    ALT 60 (H) 07/10/2024    ALKPHOS 79 07/10/2024    BILITOT 0.5 07/10/2024       LIPID PANEL -   Lab Results   Component Value Date    CHOL 165 07/10/2024    TRIG 138 07/10/2024    HDL 67.1 07/10/2024    CHHDL 2.5 07/10/2024    LDLF 72  "08/18/2023    VLDL 28 07/10/2024    NHDL 98 07/10/2024       RENAL FUNCTION PANEL -   Lab Results   Component Value Date    GLUCOSE 56 (L) 07/10/2024     07/10/2024    K 4.0 07/10/2024     07/10/2024    CO2 28 07/10/2024    ANIONGAP 13 07/10/2024    BUN 23 07/10/2024    CREATININE 1.20 07/10/2024    GFRMALE 68 08/18/2023    CALCIUM 9.3 07/10/2024    ALBUMIN 4.1 07/10/2024        No results found for: \"BNP\", \"HGBA1C\"    Last Cardiology Tests:  Stress test 6/2024:  1. No evidence of inducible ischemia or prior infarction.  2. The left ventricle is normal in size.  3. Normal LV wall motion with a post-stress LV EF estimated at 60%.    Cath 5/1/2020:  1. Left main: mild irregularities.  2. LAD: 30-40% mid-vessel disease.  3. LCx: mild irregularities.  4. RCA: 20-30% proximal angulated stenosis, 95% mid-vessel stenosis.  5. LVEDP 14mmHg, no aortic stenosis on LV-Ao gradient.  6. Successful PCI to severe mid-RCA stenosis with a 3.5 x 26mm Resolute ELI  post-dilated with a 3.5mm NC balloon at 22atm.     Stress echo 4/21/2020:  1. Indeterminate Stress Test: ECG portion normal, however noted to have chest  pain with peak exercise and subtle hypokinesia in the inferobasal portion)  2. The resting ejection fraction was estimated at 60% with a peak exercise  ejection fraction estimated at 60 to 65%.  3. At peak, there are stress-induced regional wall motion abnormalities (subtle  inferobasal HK with stress).  4. The adequate level of stress was achieved.    Assessment/Plan   Very pleasant 64 year-old gentleman with COPD/asthma, hypertension, asymptomatic spontaneous dissection of left carotid, CAD s/p PCI to RCA in May 2020.     Doing well clinically, lipids well-controlled.  Blood pressure seems to be decently controlled despite being a little bit elevated initially today--I am going to actually have him stop his amlodipine (trivial swelling) and have him merged his losartan HCTZ into losartan-HCTZ 100-25 mg daily " to attempt to reduce pill burden.  Continue current aspirin and statin.  We will see him again in 6 months to ensure we are maintaining home blood pressures--he will bring in a log at that time.           Rashad Brandt MD         [1] No family history on file.

## 2025-05-28 DIAGNOSIS — J30.9 ALLERGIC RHINITIS, UNSPECIFIED SEASONALITY, UNSPECIFIED TRIGGER: ICD-10-CM

## 2025-05-28 RX ORDER — MONTELUKAST SODIUM 10 MG/1
10 TABLET ORAL NIGHTLY
Qty: 90 TABLET | Refills: 3 | Status: SHIPPED | OUTPATIENT
Start: 2025-05-28

## 2025-06-30 DIAGNOSIS — J44.9 CHRONIC OBSTRUCTIVE PULMONARY DISEASE, UNSPECIFIED COPD TYPE (MULTI): ICD-10-CM

## 2025-07-02 RX ORDER — UMECLIDINIUM BROMIDE AND VILANTEROL TRIFENATATE 62.5; 25 UG/1; UG/1
POWDER RESPIRATORY (INHALATION) DAILY
Qty: 3 EACH | Refills: 3 | Status: SHIPPED | OUTPATIENT
Start: 2025-07-02

## 2025-07-29 DIAGNOSIS — E03.8 SUBCLINICAL HYPOTHYROIDISM: Primary | ICD-10-CM

## 2025-07-29 RX ORDER — LEVOTHYROXINE SODIUM 25 UG/1
25 TABLET ORAL DAILY
Qty: 90 TABLET | Refills: 3 | Status: SHIPPED | OUTPATIENT
Start: 2025-07-29 | End: 2026-07-29

## 2025-07-29 RX ORDER — LEVOTHYROXINE SODIUM 25 UG/1
25 TABLET ORAL DAILY
Qty: 30 TABLET | Refills: 0 | Status: SHIPPED | OUTPATIENT
Start: 2025-07-29 | End: 2026-07-29

## 2025-08-08 ENCOUNTER — APPOINTMENT (OUTPATIENT)
Dept: RESPIRATORY THERAPY | Facility: HOSPITAL | Age: 65
End: 2025-08-08
Payer: COMMERCIAL

## 2025-08-09 DIAGNOSIS — R05.3 CHRONIC COUGH: ICD-10-CM

## 2025-08-09 DIAGNOSIS — J30.9 ALLERGIC RHINITIS, UNSPECIFIED SEASONALITY, UNSPECIFIED TRIGGER: ICD-10-CM

## 2025-08-11 RX ORDER — AZELASTINE 1 MG/ML
2 SPRAY, METERED NASAL 2 TIMES DAILY
Qty: 90 ML | Refills: 3 | Status: SHIPPED | OUTPATIENT
Start: 2025-08-11

## 2025-08-13 ENCOUNTER — HOSPITAL ENCOUNTER (OUTPATIENT)
Dept: RESPIRATORY THERAPY | Facility: HOSPITAL | Age: 65
Discharge: HOME | End: 2025-08-13
Payer: COMMERCIAL

## 2025-08-13 DIAGNOSIS — J44.9 CHRONIC OBSTRUCTIVE PULMONARY DISEASE, UNSPECIFIED COPD TYPE (MULTI): ICD-10-CM

## 2025-08-13 LAB
MGC ASCENT PFT - FEV1 - POST: 3.55
MGC ASCENT PFT - FEV1 - PRE: 3.35
MGC ASCENT PFT - FEV1 - PREDICTED: 3.41
MGC ASCENT PFT - FVC - POST: 5.31
MGC ASCENT PFT - FVC - PRE: 5.33
MGC ASCENT PFT - FVC - PREDICTED: 4.44

## 2025-08-13 PROCEDURE — 94760 N-INVAS EAR/PLS OXIMETRY 1: CPT

## 2025-08-13 PROCEDURE — 94726 PLETHYSMOGRAPHY LUNG VOLUMES: CPT

## 2025-08-13 PROCEDURE — 94664 DEMO&/EVAL PT USE INHALER: CPT

## 2025-08-13 PROCEDURE — 94729 DIFFUSING CAPACITY: CPT | Performed by: INTERNAL MEDICINE

## 2025-08-13 PROCEDURE — 94060 EVALUATION OF WHEEZING: CPT

## 2025-08-13 PROCEDURE — 94726 PLETHYSMOGRAPHY LUNG VOLUMES: CPT | Performed by: INTERNAL MEDICINE

## 2025-08-13 PROCEDURE — 94729 DIFFUSING CAPACITY: CPT

## 2025-08-13 PROCEDURE — 94060 EVALUATION OF WHEEZING: CPT | Performed by: INTERNAL MEDICINE

## 2025-09-10 ENCOUNTER — APPOINTMENT (OUTPATIENT)
Dept: PRIMARY CARE | Facility: CLINIC | Age: 65
End: 2025-09-10
Payer: COMMERCIAL

## 2025-09-23 ENCOUNTER — APPOINTMENT (OUTPATIENT)
Dept: PRIMARY CARE | Facility: CLINIC | Age: 65
End: 2025-09-23
Payer: COMMERCIAL